# Patient Record
Sex: FEMALE | Employment: UNEMPLOYED | URBAN - METROPOLITAN AREA
[De-identification: names, ages, dates, MRNs, and addresses within clinical notes are randomized per-mention and may not be internally consistent; named-entity substitution may affect disease eponyms.]

---

## 2017-01-04 ENCOUNTER — TELEPHONE (OUTPATIENT)
Dept: FAMILY MEDICINE CLINIC | Facility: CLINIC | Age: 57
End: 2017-01-04

## 2017-02-04 ENCOUNTER — HOSPITAL ENCOUNTER (OUTPATIENT)
Dept: MAMMOGRAPHY | Facility: HOSPITAL | Age: 57
Discharge: HOME | End: 2017-02-04
Payer: COMMERCIAL

## 2017-02-04 DIAGNOSIS — Z12.31 SCREENING MAMMOGRAM, ENCOUNTER FOR: ICD-10-CM

## 2017-02-04 PROCEDURE — 77067 SCR MAMMO BI INCL CAD: CPT

## 2017-02-21 ENCOUNTER — OFFICE VISIT (OUTPATIENT)
Dept: FAMILY MEDICINE CLINIC | Facility: CLINIC | Age: 57
End: 2017-02-21

## 2017-02-21 VITALS
DIASTOLIC BLOOD PRESSURE: 70 MMHG | BODY MASS INDEX: 38 KG/M2 | WEIGHT: 206 LBS | OXYGEN SATURATION: 97 % | HEART RATE: 78 BPM | SYSTOLIC BLOOD PRESSURE: 112 MMHG

## 2017-02-21 DIAGNOSIS — M70.61 GREATER TROCHANTERIC BURSITIS OF RIGHT HIP: Primary | ICD-10-CM

## 2017-02-21 PROCEDURE — 99213 OFFICE O/P EST LOW 20 MIN: CPT

## 2017-02-21 RX ORDER — NAPROXEN 500 MG/1
500 TABLET ORAL 2 TIMES DAILY WITH MEALS
Qty: 60 TABLET | Refills: 0 | Status: SHIPPED | OUTPATIENT
Start: 2017-02-21 | End: 2017-03-23

## 2017-02-21 RX ORDER — METHYLPREDNISOLONE 4 MG/1
TABLET ORAL
Qty: 1 KIT | Refills: 0 | Status: SHIPPED | OUTPATIENT
Start: 2017-02-21 | End: 2017-07-25 | Stop reason: ALTCHOICE

## 2017-02-21 NOTE — PROGRESS NOTES
HPI:    Patient ID: Vic Fay is a 62year old female. Hip Pain   The pain is present in the right hip. This is a new problem. Episode onset: 2 weeks ago. The problem occurs constantly. The problem has been gradually improving.  The quality of the is oriented to person, place, and time. She appears well-developed and well-nourished. No distress. Musculoskeletal:        Right hip: She exhibits tenderness and bony tenderness. She exhibits normal range of motion, normal strength and no crepitus.    Ne

## 2017-02-22 NOTE — PATIENT INSTRUCTIONS
Bursitis [Bursitis]    Las articulaciones [employer] del cuerpo están rodeadas por sudha bolsa pequeña y Christia Kroner de líquido, para facilitar el movimiento de los músculos y los tendones sobre la articulación.   La bursitis es sudha inflamación de la bolsa · Luis M Maurer de 100.4°F (38°C) o más luisana, o jimmy le Conifer Kimberly indicado parker proveedor de atención médica  Date Last Reviewed: 11/21/2015  © 5127-4374 [address], [address] [place] [address]. Todos los derechos reservados.  Esta informació

## 2017-03-23 ENCOUNTER — NURSE ONLY (OUTPATIENT)
Dept: FAMILY MEDICINE CLINIC | Facility: CLINIC | Age: 57
End: 2017-03-23

## 2017-03-23 DIAGNOSIS — E03.9 HYPOTHYROIDISM, UNSPECIFIED TYPE: ICD-10-CM

## 2017-03-23 DIAGNOSIS — E78.00 HYPERCHOLESTEREMIA: ICD-10-CM

## 2017-03-23 LAB
ALBUMIN SERPL BCP-MCNC: 4.1 G/DL (ref 3.5–4.8)
ALBUMIN/GLOB SERPL: 1.8 {RATIO} (ref 1–2)
ALP SERPL-CCNC: 60 U/L (ref 32–100)
ALT SERPL-CCNC: 45 U/L (ref 14–54)
ANION GAP SERPL CALC-SCNC: 7 MMOL/L (ref 0–18)
AST SERPL-CCNC: 47 U/L (ref 15–41)
BILIRUB SERPL-MCNC: 0.4 MG/DL (ref 0.3–1.2)
BUN SERPL-MCNC: 15 MG/DL (ref 8–20)
BUN/CREAT SERPL: 18.3 (ref 10–20)
CALCIUM SERPL-MCNC: 9.4 MG/DL (ref 8.5–10.5)
CHLORIDE SERPL-SCNC: 107 MMOL/L (ref 95–110)
CHOLEST SERPL-MCNC: 185 MG/DL (ref 110–200)
CO2 SERPL-SCNC: 27 MMOL/L (ref 22–32)
CREAT SERPL-MCNC: 0.82 MG/DL (ref 0.5–1.5)
GLOBULIN PLAS-MCNC: 2.3 G/DL (ref 2.5–3.7)
GLUCOSE SERPL-MCNC: 87 MG/DL (ref 70–99)
HDLC SERPL-MCNC: 81 MG/DL
LDLC SERPL CALC-MCNC: 89 MG/DL (ref 0–99)
NONHDLC SERPL-MCNC: 104 MG/DL
OSMOLALITY UR CALC.SUM OF ELEC: 292 MOSM/KG (ref 275–295)
POTASSIUM SERPL-SCNC: 4.6 MMOL/L (ref 3.3–5.1)
PROT SERPL-MCNC: 6.4 G/DL (ref 5.9–8.4)
SODIUM SERPL-SCNC: 141 MMOL/L (ref 136–144)
TRIGL SERPL-MCNC: 74 MG/DL (ref 1–149)
TSH SERPL-ACNC: 0.9 UIU/ML (ref 0.34–5.6)

## 2017-03-23 PROCEDURE — 36415 COLL VENOUS BLD VENIPUNCTURE: CPT

## 2017-03-23 PROCEDURE — 80053 COMPREHEN METABOLIC PANEL: CPT

## 2017-03-23 PROCEDURE — 84443 ASSAY THYROID STIM HORMONE: CPT

## 2017-03-23 PROCEDURE — 80061 LIPID PANEL: CPT

## 2017-03-24 ENCOUNTER — TELEPHONE (OUTPATIENT)
Dept: FAMILY MEDICINE CLINIC | Facility: CLINIC | Age: 57
End: 2017-03-24

## 2017-03-24 DIAGNOSIS — E03.9 HYPOTHYROIDISM, UNSPECIFIED TYPE: ICD-10-CM

## 2017-03-24 DIAGNOSIS — E78.00 HYPERCHOLESTEREMIA: Primary | ICD-10-CM

## 2017-03-24 RX ORDER — LEVOTHYROXINE SODIUM 0.07 MG/1
75 TABLET ORAL
Qty: 90 TABLET | Refills: 3 | Status: SHIPPED | OUTPATIENT
Start: 2017-03-24 | End: 2017-07-25

## 2017-03-24 RX ORDER — FENOFIBRATE 120 MG/1
1 TABLET ORAL DAILY
Qty: 90 TABLET | Refills: 3 | Status: SHIPPED | OUTPATIENT
Start: 2017-03-24 | End: 2017-07-25

## 2017-07-18 ENCOUNTER — NURSE ONLY (OUTPATIENT)
Dept: FAMILY MEDICINE CLINIC | Facility: CLINIC | Age: 57
End: 2017-07-18

## 2017-07-18 DIAGNOSIS — Z00.00 ROUTINE GENERAL MEDICAL EXAMINATION AT A HEALTH CARE FACILITY: Primary | ICD-10-CM

## 2017-07-18 DIAGNOSIS — E03.9 ACQUIRED HYPOTHYROIDISM: ICD-10-CM

## 2017-07-18 LAB
ALBUMIN SERPL BCP-MCNC: 4.3 G/DL (ref 3.5–4.8)
ALBUMIN/GLOB SERPL: 1.7 {RATIO} (ref 1–2)
ALP SERPL-CCNC: 59 U/L (ref 32–100)
ALT SERPL-CCNC: 28 U/L (ref 14–54)
ANION GAP SERPL CALC-SCNC: 10 MMOL/L (ref 0–18)
AST SERPL-CCNC: 32 U/L (ref 15–41)
BASOPHILS # BLD: 0 K/UL (ref 0–0.2)
BASOPHILS NFR BLD: 1 %
BILIRUB SERPL-MCNC: 0.3 MG/DL (ref 0.3–1.2)
BILIRUB UR QL: NEGATIVE
BUN SERPL-MCNC: 24 MG/DL (ref 8–20)
BUN/CREAT SERPL: 28.9 (ref 10–20)
CALCIUM SERPL-MCNC: 9.2 MG/DL (ref 8.5–10.5)
CHLORIDE SERPL-SCNC: 105 MMOL/L (ref 95–110)
CHOLEST SERPL-MCNC: 176 MG/DL (ref 110–200)
CLARITY UR: CLEAR
CO2 SERPL-SCNC: 25 MMOL/L (ref 22–32)
COLOR UR: YELLOW
CREAT SERPL-MCNC: 0.83 MG/DL (ref 0.5–1.5)
EOSINOPHIL # BLD: 0.1 K/UL (ref 0–0.7)
EOSINOPHIL NFR BLD: 1 %
ERYTHROCYTE [DISTWIDTH] IN BLOOD BY AUTOMATED COUNT: 12.1 % (ref 11–15)
GLOBULIN PLAS-MCNC: 2.5 G/DL (ref 2.5–3.7)
GLUCOSE SERPL-MCNC: 82 MG/DL (ref 70–99)
GLUCOSE UR-MCNC: NEGATIVE MG/DL
HCT VFR BLD AUTO: 36.8 % (ref 35–48)
HDLC SERPL-MCNC: 81 MG/DL
HGB BLD-MCNC: 12.3 G/DL (ref 12–16)
HGB UR QL STRIP.AUTO: NEGATIVE
KETONES UR-MCNC: NEGATIVE MG/DL
LDLC SERPL CALC-MCNC: 73 MG/DL (ref 0–99)
LEUKOCYTE ESTERASE UR QL STRIP.AUTO: NEGATIVE
LYMPHOCYTES # BLD: 1.6 K/UL (ref 1–4)
LYMPHOCYTES NFR BLD: 34 %
MCH RBC QN AUTO: 30.6 PG (ref 27–32)
MCHC RBC AUTO-ENTMCNC: 33.4 G/DL (ref 32–37)
MCV RBC AUTO: 91.7 FL (ref 80–100)
MONOCYTES # BLD: 0.3 K/UL (ref 0–1)
MONOCYTES NFR BLD: 6 %
NEUTROPHILS # BLD AUTO: 2.8 K/UL (ref 1.8–7.7)
NEUTROPHILS NFR BLD: 58 %
NITRITE UR QL STRIP.AUTO: NEGATIVE
NONHDLC SERPL-MCNC: 95 MG/DL
OSMOLALITY UR CALC.SUM OF ELEC: 293 MOSM/KG (ref 275–295)
PH UR: 5 [PH] (ref 5–8)
PLATELET # BLD AUTO: 267 K/UL (ref 140–400)
PMV BLD AUTO: 7.5 FL (ref 7.4–10.3)
POTASSIUM SERPL-SCNC: 4.6 MMOL/L (ref 3.3–5.1)
PROT SERPL-MCNC: 6.8 G/DL (ref 5.9–8.4)
PROT UR-MCNC: NEGATIVE MG/DL
RBC # BLD AUTO: 4.02 M/UL (ref 3.7–5.4)
SODIUM SERPL-SCNC: 140 MMOL/L (ref 136–144)
SP GR UR STRIP: 1.02 (ref 1–1.03)
TRIGL SERPL-MCNC: 112 MG/DL (ref 1–149)
TSH SERPL-ACNC: 0.78 UIU/ML (ref 0.45–5.33)
UROBILINOGEN UR STRIP-ACNC: <2
VIT C UR-MCNC: NEGATIVE MG/DL
WBC # BLD AUTO: 4.9 K/UL (ref 4–11)

## 2017-07-18 PROCEDURE — 85025 COMPLETE CBC W/AUTO DIFF WBC: CPT

## 2017-07-18 PROCEDURE — 81003 URINALYSIS AUTO W/O SCOPE: CPT

## 2017-07-18 PROCEDURE — 36415 COLL VENOUS BLD VENIPUNCTURE: CPT

## 2017-07-18 PROCEDURE — 84443 ASSAY THYROID STIM HORMONE: CPT

## 2017-07-18 PROCEDURE — 80053 COMPREHEN METABOLIC PANEL: CPT

## 2017-07-18 PROCEDURE — 80061 LIPID PANEL: CPT

## 2017-07-18 NOTE — PROGRESS NOTES
Patient presented to clinic for routine and thyroid blood work. Orders verified in patient chart. Name and  verified. Patient is fasting. Blood drawn from the right antecubital, tolerated well without complications.

## 2017-07-25 ENCOUNTER — OFFICE VISIT (OUTPATIENT)
Dept: FAMILY MEDICINE CLINIC | Facility: CLINIC | Age: 57
End: 2017-07-25

## 2017-07-25 VITALS
DIASTOLIC BLOOD PRESSURE: 70 MMHG | SYSTOLIC BLOOD PRESSURE: 110 MMHG | BODY MASS INDEX: 38.02 KG/M2 | HEART RATE: 69 BPM | OXYGEN SATURATION: 97 % | WEIGHT: 204 LBS | HEIGHT: 61.5 IN

## 2017-07-25 DIAGNOSIS — K64.8 INTERNAL HEMORRHOIDS: ICD-10-CM

## 2017-07-25 DIAGNOSIS — Z13.31 DEPRESSION SCREENING: ICD-10-CM

## 2017-07-25 DIAGNOSIS — Z00.01 ENCOUNTER FOR ROUTINE ADULT HEALTH EXAMINATION WITH ABNORMAL FINDINGS: Primary | ICD-10-CM

## 2017-07-25 DIAGNOSIS — E66.09 NON MORBID OBESITY DUE TO EXCESS CALORIES: ICD-10-CM

## 2017-07-25 DIAGNOSIS — E03.9 HYPOTHYROIDISM, UNSPECIFIED TYPE: ICD-10-CM

## 2017-07-25 DIAGNOSIS — E78.00 HYPERCHOLESTEREMIA: ICD-10-CM

## 2017-07-25 PROBLEM — M70.61 GREATER TROCHANTERIC BURSITIS OF RIGHT HIP: Status: RESOLVED | Noted: 2017-02-21 | Resolved: 2017-07-25

## 2017-07-25 PROCEDURE — 99396 PREV VISIT EST AGE 40-64: CPT

## 2017-07-25 RX ORDER — LEVOTHYROXINE SODIUM 0.07 MG/1
75 TABLET ORAL
Qty: 90 TABLET | Refills: 1 | Status: SHIPPED | OUTPATIENT
Start: 2017-07-25 | End: 2018-01-11

## 2017-07-25 RX ORDER — FENOFIBRATE 120 MG/1
1 TABLET ORAL DAILY
Qty: 90 TABLET | Refills: 3 | Status: SHIPPED | OUTPATIENT
Start: 2017-07-25

## 2017-07-26 NOTE — PATIENT INSTRUCTIONS
Pautas de prevención para mujeres de entre 48 y 59 años de edad  [address] detección y las vacunas son importantes para el manejo de parker karen.  La consejería sobre parker karen también es esencial. A continuación, verá pautas en relación con esos temas p Clamidia Las mujeres con mayor riesgo de infección En los exámenes de rutina   Cáncer colorrectal Todas las mujeres de indira denzel de edad Sudha sigmoidoscopia flexible cada negro años o sudha colonoscopia cada [place], o un enema con doble contraste de [place] [address] de indira denzel de edad que no tienen registro de kal tenido esta infección o haberse aplicado esta vacuna Dos dosis.  La segunda dosis debería aplicársela al menos cuatro semanas después de la primera dosis   Hepatitis A Las muje Contra el herpes zóster [employer]  de 61 años o más [place] dosis   Consejería Ciarra Cogan   Pruebas sobre mutación de los genes BRCA para perfecto el riesgo de tener cáncer de ovario y cáncer de seno Las mujeres con mayor riesgo de tener mutación de Diabetes tipo 2 o prediabetes [employer] a los [facility], y los adultos que no tengan síntomas a cualquier edad, que tengan sobrepeso o que david obesos y que tengan 1 o más riesgos de diabetes Al menos cada angelia años   Uso indebido del alcoh Cáncer colorrectal Todas las mujeres de indira denzel de edad [place] sigmoidoscopia flexible cada negro años o sudha colonoscopia cada [place], o un enema con doble contraste de bario cada negro años, un análisis anual de sobia oculta en las heces o un test inm

## 2017-07-26 NOTE — PROGRESS NOTES
CC: Annual Physical Exam    HPI:   Lenny Leyden is a 62year old female who presents for a complete physical exam. Symptoms: denies discharge, itching, burning or dysuria, is menopausal. Patient denies any complaints at this time.     Wt Readings from ANDREIA Negative Negative mg/dL   Microscopic Microscopic not indicated    -CBC W/ DIFFERENTIAL   Result Value Ref Range   WBC 4.9 4.0 - 11.0 K/UL   RBC 4.02 3.70 - 5.40 M/UL   HGB 12.3 12.0 - 16.0 g/dL   HCT 36.8 35.0 - 48.0 %   MCV 91.7 80.0 - 100.0 fL   MCH 30. daughter and 2 sons. Exercise: minimal.  Diet: watches minimally     REVIEW OF SYSTEMS:   CONSTITUTIONAL:  Denies unusual weight gain/loss, fever, chills, or fatigue.   EENT:  Eyes:  Denies eye pain, visual loss, blurred vision, double vision or yellow sc erythema. Nose: patent, no nasal discharge Throat:  No tonsillar erythema or exudate. Mouth:  No oral lesions or ulcerations, good dentition. NECK: Supple, no CLAD, no JVD, no carotid bruit, no thyromegaly.   SKIN: No rashes, no skin lesion, no bruising, depressed, or hopeless (over the last two weeks)?: Not at all    PHQ-2 SCORE: 0        6. Non morbid obesity due to excess calories  7.  BMI 37.0-37.9, adult  - Pt's Body mass index is 37.92 kg/m², recommended low fat diet and aerobic exercise 30 minutes th

## 2018-01-10 ENCOUNTER — OFFICE VISIT (OUTPATIENT)
Dept: FAMILY MEDICINE CLINIC | Facility: CLINIC | Age: 58
End: 2018-01-10

## 2018-01-10 VITALS
HEART RATE: 78 BPM | WEIGHT: 200 LBS | HEIGHT: 61.5 IN | DIASTOLIC BLOOD PRESSURE: 70 MMHG | BODY MASS INDEX: 37.28 KG/M2 | SYSTOLIC BLOOD PRESSURE: 118 MMHG | RESPIRATION RATE: 18 BRPM | OXYGEN SATURATION: 98 %

## 2018-01-10 DIAGNOSIS — Z12.31 ENCOUNTER FOR SCREENING MAMMOGRAM FOR BREAST CANCER: ICD-10-CM

## 2018-01-10 DIAGNOSIS — E78.00 HYPERCHOLESTEREMIA: ICD-10-CM

## 2018-01-10 DIAGNOSIS — Z01.89 ENCOUNTER FOR ROUTINE LABORATORY TESTING: ICD-10-CM

## 2018-01-10 DIAGNOSIS — E03.9 HYPOTHYROIDISM, UNSPECIFIED TYPE: Primary | ICD-10-CM

## 2018-01-10 DIAGNOSIS — Z23 NEED FOR INFLUENZA VACCINATION: ICD-10-CM

## 2018-01-10 LAB — TSH SERPL-ACNC: 1.49 UIU/ML (ref 0.45–5.33)

## 2018-01-10 PROCEDURE — 99213 OFFICE O/P EST LOW 20 MIN: CPT

## 2018-01-10 PROCEDURE — 90686 IIV4 VACC NO PRSV 0.5 ML IM: CPT

## 2018-01-10 PROCEDURE — 90471 IMMUNIZATION ADMIN: CPT

## 2018-01-10 PROCEDURE — 84443 ASSAY THYROID STIM HORMONE: CPT

## 2018-01-11 DIAGNOSIS — E03.9 HYPOTHYROIDISM, UNSPECIFIED TYPE: ICD-10-CM

## 2018-01-11 PROBLEM — Z23 NEED FOR INFLUENZA VACCINATION: Status: ACTIVE | Noted: 2018-01-11

## 2018-01-11 PROBLEM — Z12.31 ENCOUNTER FOR SCREENING MAMMOGRAM FOR BREAST CANCER: Status: ACTIVE | Noted: 2018-01-11

## 2018-01-11 RX ORDER — LEVOTHYROXINE SODIUM 0.07 MG/1
75 TABLET ORAL
Qty: 90 TABLET | Refills: 1 | Status: SHIPPED | OUTPATIENT
Start: 2018-01-11

## 2018-01-11 NOTE — PATIENT INSTRUCTIONS
Hipotiroidismo    A usted le colón diagnosticado hipotiroidismo. [place] significa que parker glándula tiroides no está produciendo suficiente hormona tiroidea. Esta hormona es importante para el crecimiento del cuerpo y el metabolismo.  Si no tiene suficiente, mu · [place] maris pastillas de hormona tiroidea exactamente jimmy le indicó parker proveedor de [facility]. En la [employer] de los casos es sudha pastilla por día con el estómago vacío.  Use un pastillero con los días de la semana para ayudarse a recordar jose parker pas © 9687-7654 [address]. [address] [facility], [address] [facility]. Todos los derechos reservados. Esta información no pretende sustituir la atención médica profesional. Sólo parker médico puede diagnosticar y tratar un problema de karen.

## 2018-01-11 NOTE — PROGRESS NOTES
HPI:    Patient ID: Hair Klein is a 62year old female. Thyroid Problem   This is a chronic problem. The current episode started more than 1 year ago. The problem occurs daily. Progression since onset: stable.  Pertinent negatives include no abdomi distress. Cardiovascular: Normal rate, regular rhythm and normal heart sounds. Pulmonary/Chest: Effort normal and breath sounds normal. No respiratory distress. Neurological: She is alert and oriented to person, place, and time.    Skin: Skin is warm

## 2018-01-12 ENCOUNTER — TELEPHONE (OUTPATIENT)
Dept: FAMILY MEDICINE CLINIC | Facility: CLINIC | Age: 58
End: 2018-01-12

## 2018-01-12 NOTE — TELEPHONE ENCOUNTER
Patient notified about results and that she has to be back in 6 months to have routine labs done before her yearly exam.

## 2018-02-07 ENCOUNTER — HOSPITAL ENCOUNTER (OUTPATIENT)
Dept: MAMMOGRAPHY | Age: 58
Discharge: HOME | End: 2018-02-07
Payer: COMMERCIAL

## 2018-02-07 DIAGNOSIS — Z12.31 ENCOUNTER FOR SCREENING MAMMOGRAM FOR BREAST CANCER: ICD-10-CM

## 2018-02-07 PROCEDURE — 77067 SCR MAMMO BI INCL CAD: CPT

## 2018-02-22 ENCOUNTER — TELEPHONE (OUTPATIENT)
Dept: FAMILY MEDICINE CLINIC | Facility: CLINIC | Age: 58
End: 2018-02-22

## 2018-03-07 ENCOUNTER — APPOINTMENT (OUTPATIENT)
Dept: GENERAL RADIOLOGY | Facility: HOSPITAL | Age: 58
End: 2018-03-07
Attending: EMERGENCY MEDICINE
Payer: COMMERCIAL

## 2018-03-07 ENCOUNTER — HOSPITAL ENCOUNTER (EMERGENCY)
Facility: HOSPITAL | Age: 58
Discharge: HOME | End: 2018-03-07
Attending: EMERGENCY MEDICINE
Payer: COMMERCIAL

## 2018-03-07 VITALS
OXYGEN SATURATION: 97 % | HEIGHT: 62 IN | TEMPERATURE: 98 F | SYSTOLIC BLOOD PRESSURE: 123 MMHG | WEIGHT: 200 LBS | BODY MASS INDEX: 36.8 KG/M2 | RESPIRATION RATE: 18 BRPM | HEART RATE: 66 BPM | DIASTOLIC BLOOD PRESSURE: 73 MMHG

## 2018-03-07 DIAGNOSIS — S93.402A MILD SPRAIN OF LEFT ANKLE, INITIAL ENCOUNTER: Primary | ICD-10-CM

## 2018-03-07 PROCEDURE — 73610 X-RAY EXAM OF ANKLE: CPT | Performed by: EMERGENCY MEDICINE

## 2018-03-07 PROCEDURE — 73650 X-RAY EXAM OF HEEL: CPT | Performed by: EMERGENCY MEDICINE

## 2018-03-07 PROCEDURE — 99283 EMERGENCY DEPT VISIT LOW MDM: CPT

## 2018-03-07 RX ORDER — IBUPROFEN 600 MG/1
600 TABLET ORAL ONCE
Status: COMPLETED | OUTPATIENT
Start: 2018-03-07 | End: 2018-03-07

## 2018-03-07 NOTE — ED NOTES
Pt presents to ED with a c/o left ankle and heel pain after a mechanical fall last night. Denies any head injury.

## 2018-03-07 NOTE — ED PROVIDER NOTES
Patient Seen in: [facility] AND [facility] Emergency Department    History   Patient presents with: Foot Pain    Stated Complaint: left foot pain    HPI    Patient is a 43-year-old female who presents with left ankle injury that occurred yesterday.   Patient sta normal motor and sensation  SKIN: no abrasions or lacerations  M/S: TTP over left medial ankle. No deformity with FROM.  Foot/knee nontender    ED Course   Labs Reviewed - No data to display    ED Course as of Mar 07 0819  ----------------------------------

## 2018-03-09 ENCOUNTER — OFFICE VISIT (OUTPATIENT)
Dept: FAMILY MEDICINE CLINIC | Facility: CLINIC | Age: 58
End: 2018-03-09

## 2018-03-09 VITALS — SYSTOLIC BLOOD PRESSURE: 106 MMHG | DIASTOLIC BLOOD PRESSURE: 80 MMHG | OXYGEN SATURATION: 98 % | HEART RATE: 77 BPM

## 2018-03-09 DIAGNOSIS — S93.402A MILD SPRAIN OF LEFT ANKLE, INITIAL ENCOUNTER: Primary | ICD-10-CM

## 2018-03-09 DIAGNOSIS — M77.32 CALCANEAL SPUR OF LEFT FOOT: ICD-10-CM

## 2018-03-09 PROBLEM — Z23 NEED FOR INFLUENZA VACCINATION: Status: RESOLVED | Noted: 2018-01-11 | Resolved: 2018-03-09

## 2018-03-09 PROBLEM — Z12.31 ENCOUNTER FOR SCREENING MAMMOGRAM FOR BREAST CANCER: Status: RESOLVED | Noted: 2018-01-11 | Resolved: 2018-03-09

## 2018-03-09 PROBLEM — Z13.31 DEPRESSION SCREENING: Status: RESOLVED | Noted: 2017-07-25 | Resolved: 2018-03-09

## 2018-03-09 PROCEDURE — 99213 OFFICE O/P EST LOW 20 MIN: CPT

## 2018-03-09 RX ORDER — NAPROXEN 500 MG/1
500 TABLET ORAL 2 TIMES DAILY WITH MEALS
Qty: 60 TABLET | Refills: 0 | Status: SHIPPED | OUTPATIENT
Start: 2018-03-09

## 2018-03-09 NOTE — PATIENT INSTRUCTIONS
Tratamiento de los esguinces de tobillo  El tratamiento dependerá de la seriedad de parker esguince. Si se trata de un esguince grave, usted puede tardar 3 meses o más en recuperarse. Inmediatamente después de la lesión:  Repose:  Al principio, procure no · Rogers dedos del pie hacia meka, meñoo don de usted. Repita indira ejercicio rain 2–3 minutos. Date Last Reviewed: 9/28/2015  © 1883-2650 [address]. [address] [facility], [address] [facility]. Todos los derechos reservados.  Est

## 2018-03-09 NOTE — PROGRESS NOTES
HPI:    Patient ID: Susan Ortiz is a 62year old female. Heel Pain   This is a new problem. Episode onset: 3 days ago. The problem occurs daily. The problem has been gradually improving. Associated symptoms include arthralgias and joint swelling.  P 1 tablet by mouth daily. Disp: 90 tablet Rfl: 3     Allergies:No Known Allergies   PHYSICAL EXAM:   Physical Exam   Constitutional: She is oriented to person, place, and time. She appears well-developed and well-nourished. No distress.    Musculoskeletal:

## 2018-04-23 ENCOUNTER — TELEPHONE (OUTPATIENT)
Dept: FAMILY MEDICINE CLINIC | Facility: CLINIC | Age: 58
End: 2018-04-23

## 2018-04-23 NOTE — TELEPHONE ENCOUNTER
Patient called to request refill on medications due to not having insurance coverage and until they figure out the situation. Okay per Dr. Ellie carter to give her 3 extra refills. [address] [place] [address],[address] and authorized medications.  Patient is aware of ref

## 2022-04-06 ENCOUNTER — HOSPITAL ENCOUNTER (OUTPATIENT)
Age: 62
Discharge: HOME | End: 2022-04-06
Payer: COMMERCIAL

## 2022-04-06 VITALS
TEMPERATURE: 98 F | SYSTOLIC BLOOD PRESSURE: 141 MMHG | WEIGHT: 200 LBS | OXYGEN SATURATION: 98 % | DIASTOLIC BLOOD PRESSURE: 73 MMHG | RESPIRATION RATE: 18 BRPM | HEIGHT: 64 IN | HEART RATE: 92 BPM | BODY MASS INDEX: 34.15 KG/M2

## 2022-04-06 DIAGNOSIS — N30.01 ACUTE CYSTITIS WITH HEMATURIA: Primary | ICD-10-CM

## 2022-04-06 LAB
BILIRUB UR QL STRIP: NEGATIVE
CLARITY UR: CLEAR
GLUCOSE UR STRIP-MCNC: 100 MG/DL
KETONES UR STRIP-MCNC: NEGATIVE MG/DL
NITRITE UR QL STRIP: POSITIVE
PH UR STRIP: 5.5 [PH]
PROT UR STRIP-MCNC: 30 MG/DL
SP GR UR STRIP: 1.01
UROBILINOGEN UR STRIP-ACNC: 2 MG/DL

## 2022-04-06 PROCEDURE — 87086 URINE CULTURE/COLONY COUNT: CPT | Performed by: NURSE PRACTITIONER

## 2022-04-06 PROCEDURE — 87088 URINE BACTERIA CULTURE: CPT | Performed by: NURSE PRACTITIONER

## 2022-04-06 PROCEDURE — 87186 SC STD MICRODIL/AGAR DIL: CPT | Performed by: NURSE PRACTITIONER

## 2022-04-06 RX ORDER — CEPHALEXIN 500 MG/1
500 CAPSULE ORAL 2 TIMES DAILY
Qty: 14 CAPSULE | Refills: 0 | Status: SHIPPED | OUTPATIENT
Start: 2022-04-06 | End: 2022-04-13

## 2022-04-06 NOTE — ED INITIAL ASSESSMENT (HPI)
Pt here w c/o dysuria x 3 days. Pt states she started taking Azo yesterday w relief. Pt denies flank pain, no abd pain. No N/V. No fever.

## 2022-04-15 ENCOUNTER — LAB ENCOUNTER (OUTPATIENT)
Dept: LAB | Age: 62
End: 2022-04-15
Attending: FAMILY MEDICINE
Payer: COMMERCIAL

## 2022-04-15 ENCOUNTER — OFFICE VISIT (OUTPATIENT)
Dept: FAMILY MEDICINE CLINIC | Facility: CLINIC | Age: 62
End: 2022-04-15
Payer: COMMERCIAL

## 2022-04-15 VITALS
WEIGHT: 206 LBS | TEMPERATURE: 98 F | HEART RATE: 82 BPM | SYSTOLIC BLOOD PRESSURE: 105 MMHG | BODY MASS INDEX: 35.17 KG/M2 | HEIGHT: 64 IN | DIASTOLIC BLOOD PRESSURE: 67 MMHG

## 2022-04-15 DIAGNOSIS — Z23 NEED FOR SHINGLES VACCINE: ICD-10-CM

## 2022-04-15 DIAGNOSIS — E78.00 HYPERCHOLESTEREMIA: ICD-10-CM

## 2022-04-15 DIAGNOSIS — Z00.00 ENCOUNTER FOR ANNUAL HEALTH EXAMINATION: Primary | ICD-10-CM

## 2022-04-15 DIAGNOSIS — E03.9 HYPOTHYROIDISM, UNSPECIFIED TYPE: ICD-10-CM

## 2022-04-15 DIAGNOSIS — Z12.31 ENCOUNTER FOR SCREENING MAMMOGRAM FOR MALIGNANT NEOPLASM OF BREAST: ICD-10-CM

## 2022-04-15 DIAGNOSIS — Z00.00 ENCOUNTER FOR ANNUAL HEALTH EXAMINATION: ICD-10-CM

## 2022-04-15 LAB
ALBUMIN SERPL-MCNC: 3.9 G/DL (ref 3.4–5)
ALBUMIN/GLOB SERPL: 1.2 {RATIO} (ref 1–2)
ALP LIVER SERPL-CCNC: 96 U/L
ALT SERPL-CCNC: 31 U/L
ANION GAP SERPL CALC-SCNC: 6 MMOL/L (ref 0–18)
AST SERPL-CCNC: 25 U/L (ref 15–37)
BASOPHILS # BLD AUTO: 0.06 X10(3) UL (ref 0–0.2)
BASOPHILS NFR BLD AUTO: 0.8 %
BILIRUB SERPL-MCNC: 0.3 MG/DL (ref 0.1–2)
BUN BLD-MCNC: 13 MG/DL (ref 7–18)
BUN/CREAT SERPL: 15.1 (ref 10–20)
CALCIUM BLD-MCNC: 9.1 MG/DL (ref 8.5–10.1)
CHLORIDE SERPL-SCNC: 107 MMOL/L (ref 98–112)
CHOLEST SERPL-MCNC: 204 MG/DL (ref ?–200)
CO2 SERPL-SCNC: 29 MMOL/L (ref 21–32)
CREAT BLD-MCNC: 0.86 MG/DL
DEPRECATED RDW RBC AUTO: 39.4 FL (ref 35.1–46.3)
EOSINOPHIL # BLD AUTO: 0.1 X10(3) UL (ref 0–0.7)
EOSINOPHIL NFR BLD AUTO: 1.4 %
ERYTHROCYTE [DISTWIDTH] IN BLOOD BY AUTOMATED COUNT: 11.6 % (ref 11–15)
EST. AVERAGE GLUCOSE BLD GHB EST-MCNC: 111 MG/DL (ref 68–126)
FASTING PATIENT LIPID ANSWER: NO
FASTING STATUS PATIENT QL REPORTED: NO
GLOBULIN PLAS-MCNC: 3.2 G/DL (ref 2.8–4.4)
GLUCOSE BLD-MCNC: 108 MG/DL (ref 70–99)
HBA1C MFR BLD: 5.5 % (ref ?–5.7)
HCT VFR BLD AUTO: 37 %
HDLC SERPL-MCNC: 73 MG/DL (ref 40–59)
HGB BLD-MCNC: 12.1 G/DL
IMM GRANULOCYTES # BLD AUTO: 0.02 X10(3) UL (ref 0–1)
IMM GRANULOCYTES NFR BLD: 0.3 %
LDLC SERPL CALC-MCNC: 101 MG/DL (ref ?–100)
LYMPHOCYTES # BLD AUTO: 1.51 X10(3) UL (ref 1–4)
LYMPHOCYTES NFR BLD AUTO: 20.6 %
MCH RBC QN AUTO: 30.9 PG (ref 26–34)
MCHC RBC AUTO-ENTMCNC: 32.7 G/DL (ref 31–37)
MCV RBC AUTO: 94.4 FL
MONOCYTES # BLD AUTO: 0.56 X10(3) UL (ref 0.1–1)
MONOCYTES NFR BLD AUTO: 7.7 %
NEUTROPHILS # BLD AUTO: 5.07 X10 (3) UL (ref 1.5–7.7)
NEUTROPHILS # BLD AUTO: 5.07 X10(3) UL (ref 1.5–7.7)
NEUTROPHILS NFR BLD AUTO: 69.2 %
NONHDLC SERPL-MCNC: 131 MG/DL (ref ?–130)
OSMOLALITY SERPL CALC.SUM OF ELEC: 295 MOSM/KG (ref 275–295)
PLATELET # BLD AUTO: 261 10(3)UL (ref 150–450)
POTASSIUM SERPL-SCNC: 4.2 MMOL/L (ref 3.5–5.1)
PROT SERPL-MCNC: 7.1 G/DL (ref 6.4–8.2)
RBC # BLD AUTO: 3.92 X10(6)UL
SODIUM SERPL-SCNC: 142 MMOL/L (ref 136–145)
TRIGL SERPL-MCNC: 177 MG/DL (ref 30–149)
TSI SER-ACNC: 0.99 MIU/ML (ref 0.36–3.74)
VLDLC SERPL CALC-MCNC: 30 MG/DL (ref 0–30)
WBC # BLD AUTO: 7.3 X10(3) UL (ref 4–11)

## 2022-04-15 PROCEDURE — 80061 LIPID PANEL: CPT

## 2022-04-15 PROCEDURE — 85025 COMPLETE CBC W/AUTO DIFF WBC: CPT

## 2022-04-15 PROCEDURE — 3078F DIAST BP <80 MM HG: CPT | Performed by: FAMILY MEDICINE

## 2022-04-15 PROCEDURE — 3074F SYST BP LT 130 MM HG: CPT | Performed by: FAMILY MEDICINE

## 2022-04-15 PROCEDURE — 80053 COMPREHEN METABOLIC PANEL: CPT

## 2022-04-15 PROCEDURE — 99386 PREV VISIT NEW AGE 40-64: CPT | Performed by: FAMILY MEDICINE

## 2022-04-15 PROCEDURE — 83036 HEMOGLOBIN GLYCOSYLATED A1C: CPT

## 2022-04-15 PROCEDURE — 36415 COLL VENOUS BLD VENIPUNCTURE: CPT

## 2022-04-15 PROCEDURE — 3008F BODY MASS INDEX DOCD: CPT | Performed by: FAMILY MEDICINE

## 2022-04-15 PROCEDURE — 84443 ASSAY THYROID STIM HORMONE: CPT

## 2022-04-15 RX ORDER — ZOSTER VACCINE RECOMBINANT, ADJUVANTED 50 MCG/0.5
0.5 KIT INTRAMUSCULAR ONCE
Qty: 1 EACH | Refills: 0 | Status: SHIPPED | OUTPATIENT
Start: 2022-04-15 | End: 2022-04-15

## 2022-07-13 ENCOUNTER — OFFICE VISIT (OUTPATIENT)
Dept: FAMILY MEDICINE CLINIC | Facility: CLINIC | Age: 62
End: 2022-07-13
Payer: COMMERCIAL

## 2022-07-13 ENCOUNTER — HOSPITAL ENCOUNTER (OUTPATIENT)
Dept: GENERAL RADIOLOGY | Age: 62
Discharge: HOME | End: 2022-07-13
Attending: NURSE PRACTITIONER
Payer: COMMERCIAL

## 2022-07-13 VITALS
BODY MASS INDEX: 35.34 KG/M2 | WEIGHT: 207 LBS | DIASTOLIC BLOOD PRESSURE: 78 MMHG | HEIGHT: 64 IN | HEART RATE: 57 BPM | SYSTOLIC BLOOD PRESSURE: 123 MMHG

## 2022-07-13 DIAGNOSIS — S92.252A CLOSED DISPLACED FRACTURE OF NAVICULAR BONE OF LEFT FOOT, INITIAL ENCOUNTER: ICD-10-CM

## 2022-07-13 DIAGNOSIS — M79.89 SWELLING OF LEFT FOOT: ICD-10-CM

## 2022-07-13 DIAGNOSIS — M79.672 LEFT FOOT PAIN: Primary | ICD-10-CM

## 2022-07-13 DIAGNOSIS — M79.672 LEFT FOOT PAIN: ICD-10-CM

## 2022-07-13 PROCEDURE — 3078F DIAST BP <80 MM HG: CPT | Performed by: NURSE PRACTITIONER

## 2022-07-13 PROCEDURE — 73630 X-RAY EXAM OF FOOT: CPT | Performed by: NURSE PRACTITIONER

## 2022-07-13 PROCEDURE — 99214 OFFICE O/P EST MOD 30 MIN: CPT | Performed by: NURSE PRACTITIONER

## 2022-07-13 PROCEDURE — 3074F SYST BP LT 130 MM HG: CPT | Performed by: NURSE PRACTITIONER

## 2022-07-13 PROCEDURE — 3008F BODY MASS INDEX DOCD: CPT | Performed by: NURSE PRACTITIONER

## 2022-07-13 RX ORDER — IBUPROFEN 600 MG/1
600 TABLET ORAL EVERY 6 HOURS PRN
Qty: 40 TABLET | Refills: 0 | Status: SHIPPED | OUTPATIENT
Start: 2022-07-13 | End: 2022-08-12

## 2022-07-20 ENCOUNTER — OFFICE VISIT (OUTPATIENT)
Dept: PODIATRY CLINIC | Facility: CLINIC | Age: 62
End: 2022-07-20
Payer: COMMERCIAL

## 2022-07-20 ENCOUNTER — HOSPITAL ENCOUNTER (OUTPATIENT)
Dept: GENERAL RADIOLOGY | Age: 62
Discharge: HOME | End: 2022-07-20
Attending: PODIATRIST
Payer: COMMERCIAL

## 2022-07-20 DIAGNOSIS — M25.572 ACUTE LEFT ANKLE PAIN: ICD-10-CM

## 2022-07-20 DIAGNOSIS — S93.402A MILD ANKLE SPRAIN, LEFT, INITIAL ENCOUNTER: Primary | ICD-10-CM

## 2022-07-20 DIAGNOSIS — M25.472 EDEMA OF LEFT ANKLE: ICD-10-CM

## 2022-07-20 DIAGNOSIS — S93.402A MILD ANKLE SPRAIN, LEFT, INITIAL ENCOUNTER: ICD-10-CM

## 2022-07-20 DIAGNOSIS — M25.372 LEFT ANKLE INSTABILITY: ICD-10-CM

## 2022-07-20 PROCEDURE — 99203 OFFICE O/P NEW LOW 30 MIN: CPT | Performed by: PODIATRIST

## 2022-07-20 PROCEDURE — 73610 X-RAY EXAM OF ANKLE: CPT | Performed by: PODIATRIST

## 2022-11-29 ENCOUNTER — HOSPITAL ENCOUNTER (OUTPATIENT)
Dept: MAMMOGRAPHY | Facility: HOSPITAL | Age: 62
Discharge: HOME | End: 2022-11-29
Attending: FAMILY MEDICINE
Payer: COMMERCIAL

## 2022-11-29 DIAGNOSIS — Z00.00 ENCOUNTER FOR ANNUAL HEALTH EXAMINATION: ICD-10-CM

## 2022-11-29 PROCEDURE — 77063 BREAST TOMOSYNTHESIS BI: CPT | Performed by: FAMILY MEDICINE

## 2022-11-29 PROCEDURE — 77067 SCR MAMMO BI INCL CAD: CPT | Performed by: FAMILY MEDICINE

## 2025-03-05 ENCOUNTER — OFFICE VISIT (OUTPATIENT)
Age: 65
End: 2025-03-05
Payer: MEDICARE

## 2025-03-05 VITALS
OXYGEN SATURATION: 97 % | SYSTOLIC BLOOD PRESSURE: 110 MMHG | HEART RATE: 92 BPM | TEMPERATURE: 97 F | BODY MASS INDEX: 36 KG/M2 | HEIGHT: 64 IN | DIASTOLIC BLOOD PRESSURE: 60 MMHG

## 2025-03-05 DIAGNOSIS — Z00.00 PREVENTATIVE HEALTH CARE: ICD-10-CM

## 2025-03-05 DIAGNOSIS — Z91.89 AT RISK FOR OSTEOPOROSIS: ICD-10-CM

## 2025-03-05 DIAGNOSIS — Z76.89 ESTABLISHING CARE WITH NEW DOCTOR, ENCOUNTER FOR: Primary | ICD-10-CM

## 2025-03-05 DIAGNOSIS — E55.9 VITAMIN D DEFICIENCY: ICD-10-CM

## 2025-03-05 DIAGNOSIS — Z12.4 ROUTINE PAPANICOLAOU SMEAR: ICD-10-CM

## 2025-03-05 DIAGNOSIS — E78.5 HYPERLIPIDEMIA, UNSPECIFIED HYPERLIPIDEMIA TYPE: ICD-10-CM

## 2025-03-05 DIAGNOSIS — Z78.0 POSTMENOPAUSAL: ICD-10-CM

## 2025-03-05 DIAGNOSIS — Z12.31 ENCOUNTER FOR SCREENING MAMMOGRAM FOR MALIGNANT NEOPLASM OF BREAST: ICD-10-CM

## 2025-03-05 DIAGNOSIS — Z12.11 SCREEN FOR COLON CANCER: ICD-10-CM

## 2025-03-05 DIAGNOSIS — E03.9 HYPOTHYROIDISM, UNSPECIFIED TYPE: ICD-10-CM

## 2025-03-05 DIAGNOSIS — R73.03 PREDIABETES: ICD-10-CM

## 2025-03-05 DIAGNOSIS — Z11.59 NEED FOR HEPATITIS C SCREENING TEST: ICD-10-CM

## 2025-03-05 PROCEDURE — 90677 PCV20 VACCINE IM: CPT | Performed by: INTERNAL MEDICINE

## 2025-03-05 PROCEDURE — G0009 ADMIN PNEUMOCOCCAL VACCINE: HCPCS | Performed by: INTERNAL MEDICINE

## 2025-03-05 PROCEDURE — 3074F SYST BP LT 130 MM HG: CPT | Performed by: INTERNAL MEDICINE

## 2025-03-05 PROCEDURE — 99499 UNLISTED E&M SERVICE: CPT | Performed by: INTERNAL MEDICINE

## 2025-03-05 PROCEDURE — 3078F DIAST BP <80 MM HG: CPT | Performed by: INTERNAL MEDICINE

## 2025-03-05 PROCEDURE — 99205 OFFICE O/P NEW HI 60 MIN: CPT | Performed by: INTERNAL MEDICINE

## 2025-03-05 RX ORDER — TERBINAFINE HYDROCHLORIDE 250 MG/1
250 TABLET ORAL DAILY
COMMUNITY
Start: 2024-11-13

## 2025-03-06 ENCOUNTER — TELEPHONE (OUTPATIENT)
Facility: CLINIC | Age: 65
End: 2025-03-06

## 2025-03-06 NOTE — TELEPHONE ENCOUNTER
Spoke with patient and verified date of birth.     Reviewed indication to schedule telephone colon screening appointment.     Confirmed date, time and details for phone screening. Verified best contact number, 2-part GI questionnaire, and no active GI symptoms.     Verbalized understanding and appreciative for call.     Future Appointments   Date Time Provider Department Center   3/10/2025 10:00 AM GI COLON SCREENING [id number] None

## 2025-03-10 ENCOUNTER — LAB ENCOUNTER (OUTPATIENT)
Dept: LAB | Age: 65
End: 2025-03-10
Attending: INTERNAL MEDICINE
Payer: MEDICARE

## 2025-03-10 ENCOUNTER — NURSE ONLY (OUTPATIENT)
Facility: CLINIC | Age: 65
End: 2025-03-10

## 2025-03-10 DIAGNOSIS — Z12.11 COLON CANCER SCREENING: Primary | ICD-10-CM

## 2025-03-10 DIAGNOSIS — E55.9 VITAMIN D DEFICIENCY: ICD-10-CM

## 2025-03-10 LAB
ALBUMIN SERPL-MCNC: 4.3 G/DL (ref 3.2–4.8)
ALBUMIN/GLOB SERPL: 1.7 {RATIO} (ref 1–2)
ALP LIVER SERPL-CCNC: 95 U/L
ALT SERPL-CCNC: 18 U/L
ANION GAP SERPL CALC-SCNC: 9 MMOL/L (ref 0–18)
AST SERPL-CCNC: 23 U/L (ref ?–34)
BASOPHILS # BLD AUTO: 0.04 X10(3) UL (ref 0–0.2)
BASOPHILS NFR BLD AUTO: 0.8 %
BILIRUB SERPL-MCNC: 0.5 MG/DL (ref 0.2–1.1)
BUN BLD-MCNC: 15 MG/DL (ref 9–23)
BUN/CREAT SERPL: 19.5 (ref 10–20)
CALCIUM BLD-MCNC: 9 MG/DL (ref 8.7–10.4)
CHLORIDE SERPL-SCNC: 105 MMOL/L (ref 98–112)
CHOLEST SERPL-MCNC: 226 MG/DL (ref ?–200)
CO2 SERPL-SCNC: 25 MMOL/L (ref 21–32)
CREAT BLD-MCNC: 0.77 MG/DL
DEPRECATED RDW RBC AUTO: 39 FL (ref 35.1–46.3)
EGFRCR SERPLBLD CKD-EPI 2021: 86 ML/MIN/1.73M2 (ref 60–?)
EOSINOPHIL # BLD AUTO: 0.08 X10(3) UL (ref 0–0.7)
EOSINOPHIL NFR BLD AUTO: 1.7 %
ERYTHROCYTE [DISTWIDTH] IN BLOOD BY AUTOMATED COUNT: 11.6 % (ref 11–15)
EST. AVERAGE GLUCOSE BLD GHB EST-MCNC: 120 MG/DL (ref 68–126)
FASTING PATIENT LIPID ANSWER: YES
FASTING STATUS PATIENT QL REPORTED: YES
GLOBULIN PLAS-MCNC: 2.6 G/DL (ref 2–3.5)
GLUCOSE BLD-MCNC: 93 MG/DL (ref 70–99)
HBA1C MFR BLD: 5.8 % (ref ?–5.7)
HCT VFR BLD AUTO: 35.6 %
HCV AB SERPL QL IA: NONREACTIVE
HDLC SERPL-MCNC: 74 MG/DL (ref 40–59)
HGB BLD-MCNC: 11.7 G/DL
IMM GRANULOCYTES # BLD AUTO: 0.01 X10(3) UL (ref 0–1)
IMM GRANULOCYTES NFR BLD: 0.2 %
LDLC SERPL CALC-MCNC: 126 MG/DL (ref ?–100)
LYMPHOCYTES # BLD AUTO: 1.26 X10(3) UL (ref 1–4)
LYMPHOCYTES NFR BLD AUTO: 26.6 %
MCH RBC QN AUTO: 30.2 PG (ref 26–34)
MCHC RBC AUTO-ENTMCNC: 32.9 G/DL (ref 31–37)
MCV RBC AUTO: 91.8 FL
MONOCYTES # BLD AUTO: 0.39 X10(3) UL (ref 0.1–1)
MONOCYTES NFR BLD AUTO: 8.2 %
NEUTROPHILS # BLD AUTO: 2.95 X10 (3) UL (ref 1.5–7.7)
NEUTROPHILS # BLD AUTO: 2.95 X10(3) UL (ref 1.5–7.7)
NEUTROPHILS NFR BLD AUTO: 62.5 %
NONHDLC SERPL-MCNC: 152 MG/DL (ref ?–130)
OSMOLALITY SERPL CALC.SUM OF ELEC: 289 MOSM/KG (ref 275–295)
PLATELET # BLD AUTO: 255 10(3)UL (ref 150–450)
POTASSIUM SERPL-SCNC: 4.2 MMOL/L (ref 3.5–5.1)
PROT SERPL-MCNC: 6.9 G/DL (ref 5.7–8.2)
RBC # BLD AUTO: 3.88 X10(6)UL
SODIUM SERPL-SCNC: 139 MMOL/L (ref 136–145)
TRIGL SERPL-MCNC: 147 MG/DL (ref 30–149)
TSI SER-ACNC: 1.52 UIU/ML (ref 0.55–4.78)
VIT D+METAB SERPL-MCNC: 35.9 NG/ML (ref 30–100)
VLDLC SERPL CALC-MCNC: 26 MG/DL (ref 0–30)
WBC # BLD AUTO: 4.7 X10(3) UL (ref 4–11)

## 2025-03-10 PROCEDURE — 83036 HEMOGLOBIN GLYCOSYLATED A1C: CPT | Performed by: INTERNAL MEDICINE

## 2025-03-10 PROCEDURE — 82306 VITAMIN D 25 HYDROXY: CPT

## 2025-03-10 PROCEDURE — 80053 COMPREHEN METABOLIC PANEL: CPT | Performed by: INTERNAL MEDICINE

## 2025-03-10 PROCEDURE — 80061 LIPID PANEL: CPT | Performed by: INTERNAL MEDICINE

## 2025-03-10 PROCEDURE — 84443 ASSAY THYROID STIM HORMONE: CPT | Performed by: INTERNAL MEDICINE

## 2025-03-10 PROCEDURE — 86803 HEPATITIS C AB TEST: CPT | Performed by: INTERNAL MEDICINE

## 2025-03-10 PROCEDURE — 36415 COLL VENOUS BLD VENIPUNCTURE: CPT | Performed by: INTERNAL MEDICINE

## 2025-03-10 PROCEDURE — 85025 COMPLETE CBC W/AUTO DIFF WBC: CPT | Performed by: INTERNAL MEDICINE

## 2025-03-10 RX ORDER — POLYETHYLENE GLYCOL 3350, SODIUM CHLORIDE, SODIUM BICARBONATE, POTASSIUM CHLORIDE 420; 11.2; 5.72; 1.48 G/4L; G/4L; G/4L; G/4L
POWDER, FOR SOLUTION ORAL
Qty: 4000 ML | Refills: 0 | Status: SHIPPED | OUTPATIENT
Start: 2025-03-10

## 2025-03-10 NOTE — PROGRESS NOTES
Scheduling:  cln w/ [facility] w/ Dr. Phillips, Dr. Alegria  Dx: crc screening  trilyte split dose sent e-scribe

## 2025-03-10 NOTE — PROGRESS NOTES
Rosalie -    used ID#[id number]  Called patient for scheduled telephone colon screen.   Please advise on colonoscopy and bowel prep orders.   Medications, pharmacy, and allergies reviewed.     Age 45-74 y/o: Yes  › MD preference: None  › Insurance:  [employer] HMO  › Last PCP visit: Dr. Madden 3/5/25  › Last CBC: 2/27/24  › Date of positive FIT (if applicable): N/A  › H/W/BMI: 5'4 / 215 lb / 36.9 BMI    Special comments/notes: None  Telephone Colon Screening Questionnaire Yes No   Are you currently experiencing any GI symptoms [] [x]   If yes, explain:     Rectal bleeding [] [x]   Black stool [] [x]   Dysphagia or food \"feeling stuck\" when eating [] [x]   Intractable vomiting [] [x]   Unexplained weight loss [] [x]   First colonoscopy - last colon was about 5 years ago - in [place] - no polyps on last one  [] [x]   Family history of colon cancer [] [x]   Any issues with anesthesia [] [x]   If yes, explain:      Any recent complaints related to chest pain &/or shortness of breath [] [x]   Referred to a cardiologist?  [] [x]   If yes, explain:      History of respiratory issues/oxygen/PATRICK/COPD [] [x]   CPAP/BiPAP:     History of devices (pacemaker/defibrillator) [] [x]   History of heart attack &/or stroke [] [x]   If yes, in the last 12 months? Stent placement?  [] [x]     Medication Reconciliation  Yes  No   Anticoagulants (except Aspirin) [] [x]   Diabetic Medication [] [x]   Weight loss medication (phentermine/vyvanse/saxsenda/etc) [] [x]   Iron/herbal/multivitamin supplement(s) [] [x]   Usage of marijuana, CBD &/or vape product(s) [] [x]

## 2025-03-11 NOTE — PROGRESS NOTES
[facility] part of [facility]  New Patient History and Physical      HPI:     Chief Complaint   Patient presents with    New Patient       Coby Clemente is a 65 year old female presenting for:  Establish care.  Has  has a past medical history of Anemia, Healthy adult on routine physical examination, Hypothyroidism, Internal hemorrhoids (10/23/15), Obesity, Pain in soft tissues of limb, URI (upper respiratory infection), and Uterine myoma.    65-year-old female with a history of hypothyroidism and prediabetes, presents for [facility]. She reports waking up with head pain every day, which has persisted despite seeing a neurologist 5-6 years ago and taking medication for 5 years. The pain is described as mild and only present in the morning. The patient also complains of vertigo.    The patient reports experiencing chest pain. She has undergone annual mammograms, which have been normal. Additionally, she mentions nocturnal sweating, particularly from the neck up, requiring her to place a towel on her pillow. She uses a CPAP machine intermittently for sleep apnea but has not noticed a correlation between machine use and sweating.    The patient also reports symptoms of urinary incontinence. She inquires about treatment options, including medication or surgery.    Lastly, the patient mentions having cataracts in both eyes, as diagnosed by her ophthalmologist, Dr. Mendez.    Medical History  - Hypothyroidism  - Prediabetes  - Sleep apnea (implied by use of CPAP machine)  - Headaches (history of seeing neurologist and taking medication for 5 years)  - Vertigo  -  section (41 years ago)  - Incontinence (implied by discussion of potential surgery)  - Cataracts (both eyes)    Current and Past Medications and Supplements  - Levothyroxine (dosage not specified)    Social History  - Exercise habits: Patient tries to walk every day with spouse, but sometimes unable to  - Sleep: Uses CPAP  machine intermittently    Review of Systems  - General: Waking up with head pain every day  - Neurological: Vertigo  - Cardiovascular: Chest pain  - Skin: Sweating from neck up during sleep  - Genitourinary: Incontinence      Labs:   Complete Metabolic Panel:  Lab Results   Component Value Date/Time     03/10/2025 08:29 AM    K 4.2 03/10/2025 08:29 AM     03/10/2025 08:29 AM    CO2 25.0 03/10/2025 08:29 AM    CREATSERUM 0.77 03/10/2025 08:29 AM    CA 9.0 03/10/2025 08:29 AM    GLU 93 03/10/2025 08:29 AM    TP 6.9 03/10/2025 08:29 AM    ALB 4.3 03/10/2025 08:29 AM    ALKPHO 95 03/10/2025 08:29 AM    AST 23 03/10/2025 08:29 AM    ALT 18 03/10/2025 08:29 AM    BILT 0.5 03/10/2025 08:29 AM    TSH 1.520 03/10/2025 08:29 AM    T4F 1.18 06/30/2016 06:25 PM        CBC:  Lab Results   Component Value Date    WBC 4.7 03/10/2025    HGB 11.7 (L) 03/10/2025    HCT 35.6 03/10/2025    .0 03/10/2025    NEPERCENT 62.5 03/10/2025    LYPERCENT 26.6 03/10/2025    MOPERCENT 8.2 03/10/2025    EOPERCENT 1.7 03/10/2025    BAPERCENT 0.8 03/10/2025    NE 2.95 03/10/2025    LYMABS 1.26 03/10/2025    MOABSO 0.39 03/10/2025    EOABSO 0.08 03/10/2025    BAABSO 0.04 03/10/2025            Hemoglobin A1C, Microalbumin  Lab Results   Component Value Date/Time    A1C 5.8 (H) 03/10/2025 08:29 AM        Lipid panel  Lab Results   Component Value Date/Time    CHOLEST 226 (H) 03/10/2025 08:29 AM    HDL 74 (H) 03/10/2025 08:29 AM    TRIG 147 03/10/2025 08:29 AM     (H) 03/10/2025 08:29 AM    NONHDLC 152 (H) 03/10/2025 08:29 AM     The 10-year ASCVD risk score (Juancho LESTER, et al., 2019) is: 3.9%    Values used to calculate the score:      Age: 65 years      Sex: Female      Is Non- : No      Diabetic: No      Tobacco smoker: No      Systolic Blood Pressure: 110 mmHg      Is BP treated: No      HDL Cholesterol: 74 mg/dL      Total Cholesterol: 226 mg/dL       Medications:  Current Outpatient Medications    Medication Sig Dispense Refill    terbinafine 250 MG Oral Tab Take 1 tablet (250 mg total) by mouth daily.      Levothyroxine Sodium 75 MCG Oral Tab Take 1 tablet (75 mcg total) by mouth before breakfast. 90 tablet 1    PEG 3350-KCl-Na Bicarb-NaCl (TRILYTE) 420 g Oral Recon Soln Take prep as directed by gastro office. May substitute with Trilyte/generic equivalent if needed. 4000 mL 0      PMH:  Past Medical History:    Anemia    Healthy adult on routine physical examination    resolved    Hypothyroidism    Internal hemorrhoids    Obesity    Pain in soft tissues of limb    resolved    URI (upper respiratory infection)    resolved    Uterine myoma         PSH:  Past Surgical History:   Procedure Laterality Date    Breast biopsy Right 2009    benign final esults          Colonoscopy  10/23/15    internal hemorrhoids    [place] localization wire 1 site right (cpt=19281)      Other surgical history  3/2012    cauterization of fibroid       Allergies:  Allergies[1]   Social History:  Social History     Socioeconomic History    Marital status:    Tobacco Use    Smoking status: Never    Smokeless tobacco: Never   Vaping Use    Vaping status: Never Used   Substance and Sexual Activity    Alcohol use: No    Drug use: No    Sexual activity: Yes     Social Drivers of Health     Food Insecurity: No Food Insecurity (2024)    Received from [facility]    Food Insecurity     Within the past 30 days, I worried whether my food would run out before I got money to buy more. / En los últimos 30 días, me preocupó que la comida se podía acabar antes de tener dinero para compr...: Never true / Nunca     Within the past 30 days, the food that I bought just didn't last, and I didn't have money to get more. / En los últimos 30 días, La comida que compré no rindió lo suficiente, y no tenía dinero para...: Never true / Nunca        Family History:  Family History   Problem Relation Age of Onset     Diabetes Mother     Hypertension Mother     Arthritis Father     No Known Problems Daughter     No Known Problems Son     No Known Problems Son           REVIEW OF SYSTEMS:   Review of Systems   Constitutional:  Negative for chills, fatigue, fever and unexpected weight change.   HENT:  Negative for congestion, ear pain, hearing loss, rhinorrhea, sinus pain and sore throat.    Eyes:  Negative for pain, redness and visual disturbance.   Respiratory:  Negative for apnea, cough, chest tightness, shortness of breath and wheezing.    Cardiovascular:  Negative for chest pain, palpitations and leg swelling.   Gastrointestinal:  Negative for abdominal distention, abdominal pain, blood in stool, constipation and nausea.   Endocrine: Negative for cold intolerance, heat intolerance and polyuria.   Genitourinary:  Negative for dysuria, hematuria and urgency.   Musculoskeletal:  Negative for arthralgias, back pain, gait problem, joint swelling, myalgias and neck pain.   Skin:  Negative for rash and wound.   Allergic/Immunologic: Negative for food allergies and immunocompromised state.   Neurological:  Negative for dizziness, seizures, facial asymmetry, speech difficulty, weakness, light-headedness, numbness and headaches.   Hematological:  Negative for adenopathy. Does not bruise/bleed easily.   Psychiatric/Behavioral:  Negative for behavioral problems, sleep disturbance and suicidal ideas. The patient is not nervous/anxious.             PHYSICAL EXAM:   /60   Pulse 92   Temp 97.3 °F (36.3 °C)   Ht 5' 4\" (1.626 m)   SpO2 97%   BMI 35.53 kg/m²  Estimated body mass index is 35.53 kg/m² as calculated from the following:    Height as of this encounter: 5' 4\" (1.626 m).    Weight as of 7/13/22: 207 lb (93.9 kg).     Wt Readings from Last 3 Encounters:   07/13/22 207 lb (93.9 kg)   04/15/22 206 lb (93.4 kg)   04/06/22 200 lb (90.7 kg)       Physical Exam  Vitals reviewed.   Constitutional:       General: She is not in acute  distress.     Appearance: She is well-developed.   HENT:      Head: Normocephalic and atraumatic.   Eyes:      Conjunctiva/sclera: Conjunctivae normal.      Pupils: Pupils are equal, round, and reactive to light.   Neck:      Thyroid: No thyromegaly.   Cardiovascular:      Rate and Rhythm: Normal rate and regular rhythm.      Heart sounds: Normal heart sounds, S1 normal and S2 normal. No murmur heard.     No friction rub. No gallop.   Pulmonary:      Effort: Pulmonary effort is normal. No respiratory distress.      Breath sounds: Normal breath sounds. No wheezing or rales.   Chest:      Chest wall: No tenderness.   Abdominal:      General: Bowel sounds are normal. There is no distension.      Palpations: Abdomen is soft. There is no mass.      Tenderness: There is no abdominal tenderness. There is no guarding or rebound.   Musculoskeletal:         General: No tenderness. Normal range of motion.      Cervical back: Normal range of motion.   Lymphadenopathy:      Cervical: No cervical adenopathy.   Skin:     General: Skin is warm.      Findings: No erythema or rash.   Neurological:      Mental Status: She is alert and oriented to person, place, and time.      Cranial Nerves: No cranial nerve deficit.      Deep Tendon Reflexes: Reflexes are normal and symmetric.   Psychiatric:         Behavior: Behavior normal.         Thought Content: Thought content normal.         Judgment: Judgment normal.             ASSESSMENT AND PLAN:   Patient is a 65 year old female who presents primarily presents for:    Assessment and Plan:    1. Prediabetes:     - Previous blood sugar levels slightly elevated     - Patient reports eating 5 grams of sugar at 11:00 today     - Repeat fasting blood glucose test today     - Patient instructed to go to the lab for fasting tests    2. Hypothyroidism:     - Patient on levothyroxine     - Previous thyroid function tests reported as good     - Continue current levothyroxine regimen     - Repeat  thyroid function tests today    3. Chronic headache:     - Patient reports waking up with head pain every day     - Previously saw neurologist 5-6 years ago, took medication for 5 years without improvement     - Pain described as not strong and only present in the morning     - Clinician suggests sleep position or pillow as potential causes     - Prescribe gabapentin 100 mg orally once daily in the morning     - Patient informed that gabapentin can be taken without food    4. Vertigo:     - Patient reports vertigo as a current problem     - Prescribe gabapentin 100 mg orally once daily in the morning (same as for headache)     - Informed patient that gabapentin may help with vertigo    5. Night sweats:     - Patient reports feeling wet from the neck up upon waking, requiring a towel on the pillow     - Uses CPAP machine intermittently for sleep     - Clinician suggests possible relation to sleep apnea when CPAP is not used     - Monitor symptoms in relation to CPAP use     - Further evaluation pending blood test results    6. Preventive care:     - Last colonoscopy was 5 years ago and was normal     - Pap smear was done in June of the previous year     - Patient has not had a DEXA scan     - Annual mammograms have been performed     - Order DEXA scan     - Continue annual mammograms     - Repeat colonoscopy in 5 years (date to be determined)     - Next Pap smear due in approximately 2 years    7. Cataracts:     - Patient reports having cataracts in both eyes, diagnosed by ophthalmologist Dr. Mendez     - Surgery recommended and can be scheduled at patient's discretion2     - Patient to decide on timing for cataract surgery    Medications:  - Levothyroxine (current regimen)  - Gabapentin 100 mg orally once daily in the morning (new prescription)        Health Maintenance:  Health Maintenance   Topic Date Due    Mammogram  11/29/2023    COVID-19 Vaccine (5 - 2024-25 season) 09/01/2024    Annual Well Visit  Never  done    DEXA Scan  Never done    Colorectal Cancer Screening  10/23/2025    Pap Smear  06/04/2027    Influenza Vaccine  Completed    Annual Depression Screening  Completed    Fall Risk Screening (Annual)  Completed    Pneumococcal Vaccine: 50+ Years  Completed    Zoster Vaccines  Completed    Meningococcal B Vaccine  Aged Out             Meds & Refills for this Visit:  Requested Prescriptions      No prescriptions requested or ordered in this encounter       Orders Placed This Encounter   Procedures    TSH W Reflex To Free T4 [E]    CBC With Differential With Platelet    HCV Antibody    Hemoglobin A1C    Lipid Panel    Vitamin D    Comp Metabolic Panel (14) [E]    Prevnar 20 (PCV20) [43640]       Imaging & Consults:  GASTRO - INTERNAL  OBG - INTERNAL  PCV20 VACCINE FOR INTRAMUSCULAR USE  LEANNE PRADEEP 2D+3D SCREENING BILAT (CPT=77067/80795)  XR DEXA BONE DENSITOMETRY (NDY=22990)        Andrea Madden MD     No follow-ups on file.  Important issues to follow up on next visit      Patient indicates understanding of the above recommendations and agrees to the above plan.  Reasurrance and education provided. All questions answered.  Notified to call with any questions, complications, allergies, or worsening or changing symptoms as well as any side effects or complications from the treatments .  Red flags/ ER precautions discussed.    This note was produced using voice recognition software.  As a result, errors may occur.  When identified, these errors have been corrected.  While every attempt is made to correct errors during dictation, errors may still exist.    Total time spent was 70 minutes which includes: Preparation to see patient including chart review, reviewing appropriate medical history, counseling and education (diet and exercise), discussing treatment options, ordering appropriate diagnostic tests and documentation.      As part of our commitment to providing you with comprehensive, transparent, and timely  access to your health information, we adhere to the guidelines set forth by the 21st Century Cures Act. This Act enhances your rights to access your electronic health information and ensures that you can easily obtain your medical records.  Please note that the verbage used in this note is intended for medical documentation and communication and may be interpreted as forthright.  Please do not hesitate to contact my office if you have any questions.        Andrea Madden MD  Internal Medicine/Primary Care  [facility]                   [1] No Known Allergies

## 2025-03-12 ENCOUNTER — TELEPHONE (OUTPATIENT)
Age: 65
End: 2025-03-12

## 2025-03-12 NOTE — TELEPHONE ENCOUNTER
Results given and discussed with patient and recommendations were made. Patient verbalized understanding no further questions at this time.

## 2025-03-12 NOTE — TELEPHONE ENCOUNTER
----- Message from Andrea Madden sent at 3/11/2025  5:31 PM CDT -----  Inform of labs  - A1c in prediabetic range. Lipid panel with elevated LDL.  LDL (low-density lipoprotein) sometimes called \"bad\" cholesterol makes up the majority of your body's cholesterol.  High levels of LDL raise your risk of cardiovascular disease including heart disease and stroke.  Diets high in saturated fats, salts and cholesterol such as fatty meats, processed foods, dairy and cured meats can lead to elevated LDL levels.    Mild anemia on CBC.  Will monitor at next follow up.  Rest of labs with no sign abnormality.      Dr. Madden

## 2025-03-14 NOTE — PROGRESS NOTES
Patient prefers a Wednesday procedure.    Scheduled for:  Colonoscopy 02993  Provider Name:  Dr. Phillips  Date:  6/18/2025  Location:   [facility]  Sedation:  MAC  Time:  12:30 PM - Patient is aware [facility] will call the day before with arrival time.  Prep:  Trilyte  Meds/Allergies Reconciled?:  APN reviewed   Diagnosis with codes:  Colon cancer screening Z12.11  Was patient informed to call insurance with codes (Y/N):  Yes, I confirmed [employer] insurance with the patient.   Referral sent?:  Referral was sent at the time of electronic surgical scheduling.   [facility] or [facility] notified?:  I sent an electronic request to Endo Scheduling and received a confirmation today.   Medication Orders:  Hold multivitamins/supplements one week prior to procedure.  Misc Orders:  N/A     Further instructions given by staff:  I discussed the prep instructions with the patient which she verbally understood and is aware that I will mail the instructions today.

## 2025-04-01 ENCOUNTER — HOSPITAL ENCOUNTER (OUTPATIENT)
Dept: BONE DENSITY | Age: 65
Discharge: HOME | End: 2025-04-01
Attending: INTERNAL MEDICINE
Payer: MEDICARE

## 2025-04-01 ENCOUNTER — HOSPITAL ENCOUNTER (OUTPATIENT)
Dept: MAMMOGRAPHY | Age: 65
Discharge: HOME | End: 2025-04-01
Attending: INTERNAL MEDICINE
Payer: MEDICARE

## 2025-04-01 DIAGNOSIS — Z78.0 POSTMENOPAUSAL: ICD-10-CM

## 2025-04-01 DIAGNOSIS — Z12.31 ENCOUNTER FOR SCREENING MAMMOGRAM FOR MALIGNANT NEOPLASM OF BREAST: ICD-10-CM

## 2025-04-01 DIAGNOSIS — Z91.89 AT RISK FOR OSTEOPOROSIS: ICD-10-CM

## 2025-04-01 PROCEDURE — 77067 SCR MAMMO BI INCL CAD: CPT | Performed by: INTERNAL MEDICINE

## 2025-04-01 PROCEDURE — 77080 DXA BONE DENSITY AXIAL: CPT | Performed by: INTERNAL MEDICINE

## 2025-04-01 PROCEDURE — 77063 BREAST TOMOSYNTHESIS BI: CPT | Performed by: INTERNAL MEDICINE

## 2025-04-22 ENCOUNTER — TELEPHONE (OUTPATIENT)
Age: 65
End: 2025-04-22

## 2025-04-22 NOTE — TELEPHONE ENCOUNTER
Results were discussed with patient above, patient verbalized understanding no further questions at this time.

## 2025-04-22 NOTE — TELEPHONE ENCOUNTER
----- Message from Andrea Madden sent at 4/16/2025  8:49 AM CDT -----  Please inform that dexa scan returned normal.    ----- Message -----  From: Concha Oswald Rad In  Sent: 4/3/2025  11:57 AM CDT  To: Andrea Madden MD

## 2025-05-28 ENCOUNTER — OFFICE VISIT (OUTPATIENT)
Age: 65
End: 2025-05-28
Payer: MEDICARE

## 2025-05-28 VITALS
WEIGHT: 217.81 LBS | DIASTOLIC BLOOD PRESSURE: 76 MMHG | OXYGEN SATURATION: 94 % | HEART RATE: 83 BPM | SYSTOLIC BLOOD PRESSURE: 126 MMHG | BODY MASS INDEX: 37.19 KG/M2 | HEIGHT: 64 IN

## 2025-05-28 DIAGNOSIS — G89.29 CHRONIC PAIN OF RIGHT KNEE: Primary | ICD-10-CM

## 2025-05-28 DIAGNOSIS — M25.561 CHRONIC PAIN OF RIGHT KNEE: Primary | ICD-10-CM

## 2025-05-28 DIAGNOSIS — M71.22 SYNOVIAL CYST OF LEFT POPLITEAL SPACE: ICD-10-CM

## 2025-05-28 PROCEDURE — 3074F SYST BP LT 130 MM HG: CPT | Performed by: INTERNAL MEDICINE

## 2025-05-28 PROCEDURE — 99214 OFFICE O/P EST MOD 30 MIN: CPT | Performed by: INTERNAL MEDICINE

## 2025-05-28 PROCEDURE — 3078F DIAST BP <80 MM HG: CPT | Performed by: INTERNAL MEDICINE

## 2025-05-28 PROCEDURE — 3008F BODY MASS INDEX DOCD: CPT | Performed by: INTERNAL MEDICINE

## 2025-05-28 NOTE — PROGRESS NOTES
[facility] part of [facility]  Return Patient Progress Note      HPI:     Chief Complaint   Patient presents with    Knee Pain     Pain in both knees       Coby Clemente is a 65 year old female presenting for:    Has a significant  has a past medical history of Anemia, Healthy adult on routine physical examination, Hypothyroidism, Internal hemorrhoids (10/23/15), Obesity, Pain in soft tissues of limb, URI (upper respiratory infection), and Uterine myoma.    Pain in both knees primarily however in the left knee.  Some swelling behind her left knee which is really starting to cause some pain for her.  She reports that she felt that this came about after extended walking while out of town.      Labs:   CMP:  Lab Results   Component Value Date/Time     03/10/2025 08:29 AM    K 4.2 03/10/2025 08:29 AM     03/10/2025 08:29 AM    CO2 25.0 03/10/2025 08:29 AM    CREATSERUM 0.77 03/10/2025 08:29 AM    CA 9.0 03/10/2025 08:29 AM    GLU 93 03/10/2025 08:29 AM    TP 6.9 03/10/2025 08:29 AM    ALB 4.3 03/10/2025 08:29 AM    ALKPHO 95 03/10/2025 08:29 AM    AST 23 03/10/2025 08:29 AM    ALT 18 03/10/2025 08:29 AM    BILT 0.5 03/10/2025 08:29 AM    TSH 1.520 03/10/2025 08:29 AM    T4F 1.18 06/30/2016 06:25 PM        CBC:  Lab Results   Component Value Date    WBC 4.7 03/10/2025    HGB 11.7 (L) 03/10/2025    HCT 35.6 03/10/2025    .0 03/10/2025    NEPERCENT 62.5 03/10/2025    LYPERCENT 26.6 03/10/2025    MOPERCENT 8.2 03/10/2025    EOPERCENT 1.7 03/10/2025    BAPERCENT 0.8 03/10/2025    NE 2.95 03/10/2025    LYMABS 1.26 03/10/2025    MOABSO 0.39 03/10/2025    EOABSO 0.08 03/10/2025    BAABSO 0.04 03/10/2025          Hemoglobin A1C, Microalbumin  Lab Results   Component Value Date/Time    A1C 5.8 (H) 03/10/2025 08:29 AM        Lipid panel  Lab Results   Component Value Date/Time    CHOLEST 226 (H) 03/10/2025 08:29 AM    HDL 74 (H) 03/10/2025 08:29 AM    TRIG 147 03/10/2025 08:29 AM      (H) 03/10/2025 08:29 AM    NONHDLC 152 (H) 03/10/2025 08:29 AM        Medications:  Current Medications[1]   PMH:  Past Medical History[2]            REVIEW OF SYSTEMS:   Review of Systems   Constitutional:  Negative for chills, fatigue, fever and unexpected weight change.   HENT:  Negative for congestion, ear pain, hearing loss, rhinorrhea, sinus pain and sore throat.    Eyes:  Negative for pain, redness and visual disturbance.   Respiratory:  Negative for apnea, cough, chest tightness, shortness of breath and wheezing.    Cardiovascular:  Negative for chest pain, palpitations and leg swelling.   Gastrointestinal:  Negative for abdominal distention, abdominal pain, blood in stool, constipation and nausea.   Endocrine: Negative for cold intolerance, heat intolerance and polyuria.   Genitourinary:  Negative for dysuria, hematuria and urgency.   Musculoskeletal:  Negative for arthralgias, back pain, gait problem, joint swelling, myalgias and neck pain.        Knee pain    Skin:  Negative for rash and wound.   Allergic/Immunologic: Negative for food allergies and immunocompromised state.   Neurological:  Negative for dizziness, seizures, facial asymmetry, speech difficulty, weakness, light-headedness, numbness and headaches.   Hematological:  Negative for adenopathy. Does not bruise/bleed easily.   Psychiatric/Behavioral:  Negative for behavioral problems, sleep disturbance and suicidal ideas. The patient is not nervous/anxious.             PHYSICAL EXAM:   /76   Pulse 83   Ht 5' 4\" (1.626 m)   Wt 217 lb 12.8 oz (98.8 kg)   SpO2 94%   BMI 37.39 kg/m²  Estimated body mass index is 37.39 kg/m² as calculated from the following:    Height as of this encounter: 5' 4\" (1.626 m).    Weight as of this encounter: 217 lb 12.8 oz (98.8 kg).     Wt Readings from Last 3 Encounters:   05/28/25 217 lb 12.8 oz (98.8 kg)   07/13/22 207 lb (93.9 kg)   04/15/22 206 lb (93.4 kg)       Physical Exam  Vitals reviewed.    Constitutional:       General: She is not in acute distress.     Appearance: She is well-developed.   HENT:      Head: Normocephalic and atraumatic.   Eyes:      Conjunctiva/sclera: Conjunctivae normal.      Pupils: Pupils are equal, round, and reactive to light.   Neck:      Thyroid: No thyromegaly.   Cardiovascular:      Rate and Rhythm: Normal rate and regular rhythm.      Heart sounds: Normal heart sounds, S1 normal and S2 normal. No murmur heard.     No friction rub. No gallop.   Pulmonary:      Effort: Pulmonary effort is normal. No respiratory distress.      Breath sounds: Normal breath sounds. No wheezing or rales.   Chest:      Chest wall: No tenderness.   Abdominal:      General: Bowel sounds are normal. There is no distension.      Palpations: Abdomen is soft. There is no mass.      Tenderness: There is no abdominal tenderness. There is no guarding or rebound.   Musculoskeletal:         General: No tenderness. Normal range of motion.      Cervical back: Normal range of motion.      Comments: Swelling behing left knee     Lymphadenopathy:      Cervical: No cervical adenopathy.   Skin:     General: Skin is warm.      Findings: No erythema or rash.   Neurological:      Mental Status: She is alert and oriented to person, place, and time.      Cranial Nerves: No cranial nerve deficit.      Deep Tendon Reflexes: Reflexes are normal and symmetric.   Psychiatric:         Behavior: Behavior normal.         Thought Content: Thought content normal.         Judgment: Judgment normal.               ASSESSMENT AND PLAN:   Patient is a 65 year old female who presents primarily presents for:    (M25.561,  G89.29) Chronic pain of right knee  (primary encounter diagnosis)  Plan: US LEG LEFT LIMITED (CPT=76882)            (M71.22) Synovial cyst of left popliteal space  Plan: US LEG LEFT LIMITED (CPT=76882), Physiatry         Referral - In Network          Swelling behind left knee appears to be a likely Baker's cyst.   Supportive treatment with an Ace bandage and elevation recommended.  If no improvement recommend that she complete an ultrasound of her left leg.  Schedule with physiatry also to discuss further treatment options                Health Maintenance:    Health Maintenance   Topic Date Due    COVID-19 Vaccine (5 - 2024-25 season) 09/01/2024    Annual Well Visit  Never done    Colorectal Cancer Screening  10/23/2025    Mammogram  04/01/2026    Pap Smear  06/04/2027    Influenza Vaccine  Completed    DEXA Scan  Completed    Annual Depression Screening  Completed    Fall Risk Screening (Annual)  Completed    Pneumococcal Vaccine: 50+ Years  Completed    Zoster Vaccines  Completed    Meningococcal B Vaccine  Aged Out         Meds & Refills for this Visit:  Requested Prescriptions      No prescriptions requested or ordered in this encounter       No orders of the defined types were placed in this encounter.      Imaging & Consults:  None          No follow-ups on file.  Important follow up notes/labs for next visit      Patient indicates understanding of the above recommendations and agrees to the above plan.  Reasurrance and education provided. All questions answered.    Notified to call with any questions, complications, allergies, or worsening or changing symptoms as well as any side effects or complications from the treatments .  Red flags/ ER precautions discussed.    If diagnostic labs or imaging ordered advised patient to contact my office for results  24-48 hours after completion    This note was dictated using dragon speech recognition transcription software.  Typographical and transcription errors may be present.  Please call if any questions.       As part of our commitment to providing you with comprehensive, transparent, and timely access to your health information, we adhere to the guidelines set forth by the 21st Century Cures Act. This Act enhances your rights to access your electronic health information and  ensures that you can easily obtain your medical records.  Please note that the verbage used in this note is intended for medical documentation and communication and may be interpreted as forthright.  Please do not hesitate to contact my office if you have any questions.            Andrea Madden MD  [facility]                         [1]   Current Outpatient Medications   Medication Sig Dispense Refill    terbinafine 250 MG Oral Tab Take 1 tablet (250 mg total) by mouth daily.      Levothyroxine Sodium 75 MCG Oral Tab Take 1 tablet (75 mcg total) by mouth before breakfast. 90 tablet 1    PEG 3350-KCl-Na Bicarb-NaCl (TRILYTE) 420 g Oral Recon Soln Take prep as directed by gastro office. May substitute with Trilyte/generic equivalent if needed. (Patient not taking: Reported on 5/28/2025) 4000 mL 0   [2]   Past Medical History:   Anemia    Healthy adult on routine physical examination    resolved    Hypothyroidism    Internal hemorrhoids    Obesity    Pain in soft tissues of limb    resolved    URI (upper respiratory infection)    resolved    Uterine myoma

## 2025-06-12 RX ORDER — ASCORBIC ACID 500 MG
TABLET ORAL
COMMUNITY
End: 2025-06-12

## 2025-06-14 ENCOUNTER — APPOINTMENT (OUTPATIENT)
Dept: GENERAL RADIOLOGY | Facility: HOSPITAL | Age: 65
End: 2025-06-14
Attending: EMERGENCY MEDICINE
Payer: MEDICARE

## 2025-06-14 ENCOUNTER — HOSPITAL ENCOUNTER (EMERGENCY)
Facility: HOSPITAL | Age: 65
Discharge: HOME | End: 2025-06-15
Attending: EMERGENCY MEDICINE
Payer: MEDICARE

## 2025-06-14 VITALS
OXYGEN SATURATION: 99 % | RESPIRATION RATE: 18 BRPM | TEMPERATURE: 98 F | HEART RATE: 72 BPM | SYSTOLIC BLOOD PRESSURE: 175 MMHG | DIASTOLIC BLOOD PRESSURE: 77 MMHG

## 2025-06-14 DIAGNOSIS — S86.812A STRAIN OF LEFT CALF MUSCLE: Primary | ICD-10-CM

## 2025-06-14 PROCEDURE — 99284 EMERGENCY DEPT VISIT MOD MDM: CPT

## 2025-06-14 PROCEDURE — 73560 X-RAY EXAM OF KNEE 1 OR 2: CPT | Performed by: EMERGENCY MEDICINE

## 2025-06-14 PROCEDURE — 99283 EMERGENCY DEPT VISIT LOW MDM: CPT

## 2025-06-14 PROCEDURE — 73610 X-RAY EXAM OF ANKLE: CPT | Performed by: EMERGENCY MEDICINE

## 2025-06-14 PROCEDURE — 73630 X-RAY EXAM OF FOOT: CPT | Performed by: EMERGENCY MEDICINE

## 2025-06-14 RX ORDER — IBUPROFEN 600 MG/1
600 TABLET, FILM COATED ORAL ONCE
Status: COMPLETED | OUTPATIENT
Start: 2025-06-14 | End: 2025-06-14

## 2025-06-15 RX ORDER — HYDROCODONE BITARTRATE AND ACETAMINOPHEN 5; 325 MG/1; MG/1
1 TABLET ORAL EVERY 6 HOURS PRN
Qty: 16 TABLET | Refills: 0 | Status: SHIPPED | OUTPATIENT
Start: 2025-06-15 | End: 2025-06-22

## 2025-06-15 NOTE — ED INITIAL ASSESSMENT (HPI)
Pt arrived c/o fall & left knee pain after missing a step with her right foot, felt she pulled something on her left leg. Denies headstrike, no loc. Pt has pain issues behind her left knee, has an appt for an xr for this. Pt states she cannot put weight on it, hurts too much to bend. Took 1000mg of tylenol 30min pta. Pt is A&Ox4.

## 2025-06-15 NOTE — DISCHARGE INSTRUCTIONS
Please follow up with your doctor as you may benefit from an ultrasound to look for a bakers cyst behind your knee

## 2025-06-15 NOTE — ED PROVIDER NOTES
Patient Seen in: [facility] Emergency Department    History     Chief Complaint   Patient presents with    Fall    Foot Pain       HPI    65-year-old female presents to the ER for evaluation of left lower leg pain.  Son is at bedside translating and states that patient had injured her left calf about a week earlier when she had slipped.  She had not fallen but since then she has had some calf pain.  She missed stepped with her other foot today and now she is having even more pain in her left leg but it is a little bit lower and lateral in the back of her leg rather than medial and superior in the calf as previous.  Patient did take some Tylenol prior to ED arrival.  No numbness or tingling    History from Independent Source: Son gave history due to language barrier    External Records Reviewed: On chart review, patient saw primary care on May 28 for bilateral knee pain worse in the left.  She had an ultrasound ordered of the left lower extremity which has not been completed as of yet.  There was some concern about possible swelling behind the knee.    History reviewed. Past Medical History[1]    History reviewed. Past Surgical History[2]      Medications :  Prescriptions Prior to Admission[3]     Family History[4]    Smoking Status: Social Hx on file[5]    Constitutional and vital signs reviewed.      Social History and Family History elements reviewed from today, pertinent positives to the presenting problem noted.    Physical Exam     ED Triage Vitals [06/14/25 2125]   BP (!) 184/79   Pulse 79   Resp 19   Temp 97.9 °F (36.6 °C)   Temp src Temporal   SpO2 97 %   O2 Device None (Room air)       Physical Exam   Constitutional: AAOx3, well nourished, NAD  HEENT: Normocephalic, PERRLA, MMM  CV: s1s2+, RRR, no m/r/g, normal distal pulses  Pulmonary/Chest: CTA b/l with no rales, wheezes.  No chest wall tenderness  Abdominal: Nontender.  Nondistended. Soft. Bowel sounds are normal.   Neck/Back:   :    Musculoskeletal: Mild tenderness to palpation in the proximal left calf as well as distal lateral calf.  No significant edema noted.  No erythema.  Normal range of motion. No deformity.   Neurological: Awake, alert. Normal reflexes. No cranial nerve deficit.    Skin: Skin is warm and dry. No rash noted. No erythema.   Psychiatric:      All measures to prevent infection transmission during my interaction with the patient were taken. The patient was already wearing a droplet mask on my arrival to the room. Personal protective equipment was worn throughout the duration of the exam.      ED Course      Labs Reviewed - No data to display  My Independent Interpretation of EKG (if performed):     Imaging Results Available and Reviewed while in ED: No results found.  ED Medications Administered:   Medications   ibuprofen (Motrin) tab 600 mg (600 mg Oral Given 6/14/25 2305)             MDM     Vitals:    06/14/25 2125 06/14/25 2215   BP: (!) 184/79 (!) 175/77   Pulse: 79 72   Resp: 19 18   Temp: 97.9 °F (36.6 °C)    TempSrc: Temporal    SpO2: 97% 99%     *I personally reviewed and interpreted all ED vitals.    Independent Interpretation of Studies: I have independently reviewed left knee, ankle, foot x-rays.  There are no significant acute findings    Social Determinants of Health:     Procedures:      Differential/MDM/Shared Decision Making: Differential Diagnosis includes muscular strain, Baker's cyst, fracture, others.      The patient already  has a past medical history of Anemia, Disorder of thyroid, Healthy adult on routine physical examination, Hypothyroidism, Internal hemorrhoids (10/23/2015), Obesity, Osteoarthritis, Pain in soft tissues of limb, URI (upper respiratory infection), Uterine myoma, and Visual impairment.  to contribute to the complexity of this ED evaluation.           Medications, Diagnostics, or Disposition considered but not done:     Patient likely has muscular strain as well as possibility of  Morel's cyst.  Management of case was discussed with patient and son.  Will discharge with follow-up with their doctor for their ultrasound.      Condition upon leaving the department: Stable    Disposition and Plan     Clinical Impression:  1. Strain of left calf muscle        Disposition:  Discharge    Follow-up:  Andrea Madden MD  [facility]  [address] [facility] [address]  [phone]    Call        Medications Prescribed:  Current Discharge Medication List        START taking these medications    Details   HYDROcodone-acetaminophen 5-325 MG Oral Tab Take 1 tablet by mouth every 6 (six) hours as needed for Pain.  Qty: 16 tablet, Refills: 0    Associated Diagnoses: Strain of left calf muscle                      [1]   Past Medical History:   Anemia    Disorder of thyroid    Healthy adult on routine physical examination    resolved    Hypothyroidism    Internal hemorrhoids    Obesity    Osteoarthritis    Pain in soft tissues of limb    resolved    URI (upper respiratory infection)    resolved    Uterine myoma    Visual impairment    Glasses   [2]   Past Surgical History:  Procedure Laterality Date    Breast biopsy Right 2009    benign final esults          Colonoscopy  10/23/15    internal hemorrhoids    [place] localization wire 1 site right (cpt=19281)      Other surgical history  3/2012    cauterization of fibroid   [3] (Not in a hospital admission)   [4]   Family History  Problem Relation Age of Onset    Diabetes Mother     Hypertension Mother     Arthritis Father     No Known Problems Daughter     No Known Problems Son     No Known Problems Son    [5]   Social History  Socioeconomic History    Marital status:    Tobacco Use    Smoking status: Never    Smokeless tobacco: Never   Vaping Use    Vaping status: Never Used   Substance and Sexual Activity    Alcohol use: No    Drug use: No    Sexual activity: Yes

## 2025-06-16 ENCOUNTER — TELEPHONE (OUTPATIENT)
Age: 65
End: 2025-06-16

## 2025-06-16 NOTE — TELEPHONE ENCOUNTER
Pt is calling wanting to let dr. Madden know that on 06/14 went to er due to pain on leg getting worse. Pt mention that dr. Madden believe pt had a cyst and doctor had send pt to see Dr. Abdirahman Bailey, pt has appointment on 06/23. Pt is aware that Dr. Madden is not in the office.

## 2025-06-17 ENCOUNTER — TELEPHONE (OUTPATIENT)
Facility: CLINIC | Age: 65
End: 2025-06-17

## 2025-06-17 NOTE — TELEPHONE ENCOUNTER
Patient would like to go over the instructions for taking the colonoscopy preparation for tomorrows procedure.

## 2025-06-18 ENCOUNTER — ANESTHESIA (OUTPATIENT)
Dept: ENDOSCOPY | Facility: HOSPITAL | Age: 65
End: 2025-06-18
Payer: MEDICARE

## 2025-06-18 ENCOUNTER — HOSPITAL ENCOUNTER (OUTPATIENT)
Facility: HOSPITAL | Age: 65
Setting detail: HOSPITAL OUTPATIENT SURGERY
Discharge: HOME | End: 2025-06-18
Attending: INTERNAL MEDICINE | Admitting: INTERNAL MEDICINE
Payer: MEDICARE

## 2025-06-18 ENCOUNTER — ANESTHESIA EVENT (OUTPATIENT)
Dept: ENDOSCOPY | Facility: HOSPITAL | Age: 65
End: 2025-06-18
Payer: MEDICARE

## 2025-06-18 VITALS
RESPIRATION RATE: 13 BRPM | HEIGHT: 64 IN | WEIGHT: 217 LBS | SYSTOLIC BLOOD PRESSURE: 148 MMHG | DIASTOLIC BLOOD PRESSURE: 64 MMHG | BODY MASS INDEX: 37.05 KG/M2 | OXYGEN SATURATION: 99 % | HEART RATE: 59 BPM

## 2025-06-18 DIAGNOSIS — Z12.11 COLON CANCER SCREENING: ICD-10-CM

## 2025-06-18 PROBLEM — K63.5 POLYP OF COLON: Status: ACTIVE | Noted: 2025-06-18

## 2025-06-18 PROCEDURE — 45385 COLONOSCOPY W/LESION REMOVAL: CPT | Performed by: INTERNAL MEDICINE

## 2025-06-18 RX ORDER — SODIUM CHLORIDE, SODIUM LACTATE, POTASSIUM CHLORIDE, CALCIUM CHLORIDE 600; 310; 30; 20 MG/100ML; MG/100ML; MG/100ML; MG/100ML
INJECTION, SOLUTION INTRAVENOUS CONTINUOUS
Status: DISCONTINUED | OUTPATIENT
Start: 2025-06-18 | End: 2025-06-18

## 2025-06-18 RX ORDER — LIDOCAINE HYDROCHLORIDE 20 MG/ML
INJECTION, SOLUTION EPIDURAL; INFILTRATION; INTRACAUDAL; PERINEURAL AS NEEDED
Status: DISCONTINUED | OUTPATIENT
Start: 2025-06-18 | End: 2025-06-18 | Stop reason: SURG

## 2025-06-18 RX ORDER — NALOXONE HYDROCHLORIDE 0.4 MG/ML
0.08 INJECTION, SOLUTION INTRAMUSCULAR; INTRAVENOUS; SUBCUTANEOUS ONCE AS NEEDED
Status: DISCONTINUED | OUTPATIENT
Start: 2025-06-18 | End: 2025-06-18

## 2025-06-18 RX ADMIN — SODIUM CHLORIDE, SODIUM LACTATE, POTASSIUM CHLORIDE, CALCIUM CHLORIDE: 600; 310; 30; 20 INJECTION, SOLUTION INTRAVENOUS at 10:04:00

## 2025-06-18 RX ADMIN — SODIUM CHLORIDE, SODIUM LACTATE, POTASSIUM CHLORIDE, CALCIUM CHLORIDE: 600; 310; 30; 20 INJECTION, SOLUTION INTRAVENOUS at 10:28:00

## 2025-06-18 RX ADMIN — LIDOCAINE HYDROCHLORIDE 40 MG: 20 INJECTION, SOLUTION EPIDURAL; INFILTRATION; INTRACAUDAL; PERINEURAL at 10:06:00

## 2025-06-18 NOTE — DISCHARGE INSTRUCTIONS
Home Care Instructions for Colonoscopy  Diet:  - Resume your regular diet as tolerated unless otherwise instructed.  - Start with light meals to minimize bloating.  - Do not drink alcohol today.    Medication:  - If you have questions about resuming your normal medications, please contact your Primary Care Physician.    Activities:  - Take it easy today. Do not return to work today.  - Do not drive today.  - Do not operate any machinery today (including kitchen equipment).    Colonoscopy:  - You may notice some rectal \"spotting\" (a little blood on the toilet tissue) for a day or two after the exam. This is normal.  - If you experience any rectal bleeding (not spotting), persistent tenderness or sharp severe abdominal pains, oral temperature over 100 degrees Fahrenheit, light-headedness or dizziness, or any other problems, contact your doctor.      **If unable to reach your doctor, please go to the [facility] Emergency Room**    - Your referring physician will receive a full report of your examination.  - If you do not hear from your doctor's office within two weeks of your biopsy, please call them for your results.    You may be able to see your laboratory results in [employer] between 4 and 7 business days.  In some cases, your physician may not have viewed the results before they are released to [employer].  If you have questions regarding your results contact the physician who ordered the test/exam by phone or via [employer] by choosing \"Ask a Medical Question.\"

## 2025-06-18 NOTE — ANESTHESIA POSTPROCEDURE EVALUATION
Patient: Coby Clemente    Procedure Summary       Date: 06/18/25 Room / Location: [facility] ENDOSCOPY 02 / [facility] ENDOSCOPY    Anesthesia Start: 1004 Anesthesia Stop:     Procedure: COLONOSCOPY Diagnosis:       Colon cancer screening      (Colon polyps, hemorrhoids)    Surgeons: LIAM Phillips MD Anesthesiologist: Veronica Irizarry CRNA    Anesthesia Type: general, MAC ASA Status: 1            Anesthesia Type: general, MAC    Vitals Value Taken Time   /66 06/18/25 10:32   Temp  06/18/25 10:32   Pulse 73 06/18/25 10:31   Resp 20 06/18/25 10:31   SpO2 100 % 06/18/25 10:31   Vitals shown include unfiled device data.    [facility] AN Post Evaluation:   Patient Evaluated in Patient location: Endo recovery.  Patient Participation: complete - patient participated  Level of Consciousness: awake and alert  Pain Score: 0  Pain Management: adequate  Airway Patency:patent  Yes    Nausea/Vomiting: none  Cardiovascular Status: acceptable  Respiratory Status: acceptable  Postoperative Hydration acceptable      Veronica Irizarry CRNA  6/18/2025 10:32 AM

## 2025-06-18 NOTE — H&P
History & Physical Examination    Patient Name: Coby Clemente  MRN: [id number]  [facility]: [id number]  YOB: 1960    Diagnosis: screening for colon cancer    Prescriptions Prior to Admission[1]  Current Hospital Medications[2]    Allergies: Allergies[3]    Past Medical History[4]  Past Surgical History[5]  Family History[6]  Social History     Tobacco Use    Smoking status: Never    Smokeless tobacco: Never   Substance Use Topics    Alcohol use: No       SYSTEM Check if Review is Normal Check if Physical Exam is Normal If not normal, please explain:   HEENT [X ] [ X]    NECK  [X ] [ X]    HEART [X ] [ X]    LUNGS [X ] [ X]    ABDOMEN [X ] [ X]    EXTREMITIES [X ] [ X]    OTHER        I have discussed the risks and benefits and alternatives of the procedure with the patient/family.  They understand and agree to proceed with plan of care.   I have reviewed the History and Physical done within the last 30 days.  Any changes noted above.    DYLLAN Phillips MD  [facility] - Gastroenterology  6/18/2025  10:30 AM                 [1]   Medications Prior to Admission   Medication Sig Dispense Refill Last Dose/Taking    Omega-3 Fatty Acids (OMEGA-3 FISH OIL OR) Take by mouth.   6/13/2025    Calcium Carbonate (CALCIUM 500 OR) Take by mouth.   6/12/2025    Levothyroxine Sodium 75 MCG Oral Tab Take 1 tablet (75 mcg total) by mouth before breakfast. 90 tablet 1 6/17/2025    HYDROcodone-acetaminophen 5-325 MG Oral Tab Take 1 tablet by mouth every 6 (six) hours as needed for Pain. 16 tablet 0     PEG 3350-KCl-Na Bicarb-NaCl (TRILYTE) 420 g Oral Recon Soln Take prep as directed by gastro office. May substitute with Trilyte/generic equivalent if needed. 4000 mL 0    [2]   Current Facility-Administered Medications   Medication Dose Route Frequency    lactated ringers infusion   Intravenous Continuous     Facility-Administered Medications Ordered in Other Encounters   Medication Dose Route Frequency    lidocaine PF  (Xylocaine-MPF) 2% injection   Intravenous PRN    propofol (Diprivan) 10 MG/ML injection   Intravenous PRN    propofol (Diprivan) 10 mg/mL infusion premix   Intravenous Continuous PRN   [3] No Known Allergies  [4]   Past Medical History:   Anemia    Disorder of thyroid    Healthy adult on routine physical examination    resolved    Hypothyroidism    Internal hemorrhoids    Obesity    Osteoarthritis    Pain in soft tissues of limb    resolved    URI (upper respiratory infection)    resolved    Uterine myoma    Visual impairment    Glasses   [5]   Past Surgical History:  Procedure Laterality Date    Breast biopsy Right 2009    benign final esults      1984    Colonoscopy  10/23/2015    internal hemorrhoids    Colonoscopy N/A 2025    LIAM Phillips MD    [facility] localization wire 1 site right (cpt=19281)      Other surgical history  2012    cauterization of fibroid   [6]   Family History  Problem Relation Age of Onset    Diabetes Mother     Hypertension Mother     Arthritis Father     No Known Problems Daughter     No Known Problems Son     No Known Problems Son

## 2025-06-18 NOTE — ANESTHESIA PREPROCEDURE EVALUATION
Anesthesia PreOp Note    HPI:     Coby Clemente is a 65 year old female who presents for preoperative consultation requested by: LIAM Phillips MD    Date of Surgery: 6/18/2025    Procedure(s):  COLONOSCOPY  Indication: Colon cancer screening    Relevant Problems   No relevant active problems       NPO:  Last Liquid Consumption Date: 06/18/25  Last Liquid Consumption Time: 0600  Last Solid Consumption Date: 06/17/25  Last Solid Consumption Time: 1200  Last Liquid Consumption Date: 06/18/25          History Review:  Patient Active Problem List    Diagnosis Date Noted   • Mild sprain of left ankle 03/09/2018   • Calcaneal spur of left foot 03/09/2018   • BMI 37.0-37.9, adult 07/25/2017   • Hypothyroidism 06/22/2016   • Non morbid obesity due to excess calories 06/22/2016   • Hypercholesteremia 06/22/2016   • Internal hemorrhoids 10/23/2015       Past Medical History[1]    Past Surgical History[2]    Prescriptions Prior to Admission[3]  Current Medications and Prescriptions Ordered in Epic[4]    Allergies[5]    Family History[6]  Social Hx on file[7]    Available pre-op labs reviewed.             Vital Signs:  Body mass index is 37.25 kg/m².   height is 1.626 m (5' 4\") and weight is 98.4 kg (217 lb). Her blood pressure is 158/68 and her pulse is 64. Her respiration is 14 and oxygen saturation is 98%.   Vitals:    06/12/25 1054 06/18/25 0954   BP:  158/68   Pulse:  64   Resp:  14   SpO2:  98%   Weight: 98.4 kg (217 lb)    Height: 1.626 m (5' 4\")         Anesthesia Evaluation     Patient summary reviewed and Nursing notes reviewed    Airway   Mallampati: I  TM distance: >3 FB  Neck ROM: full  Dental      Pulmonary - normal exam   Cardiovascular - normal exam  Exercise tolerance: good    Neuro/Psych      GI/Hepatic/Renal    (+) bowel prep    Endo/Other    Abdominal                Anesthesia Plan:   ASA:  1  Plan:   General and MAC  Plan Comments: Discussed anesthetic risks such as but, not limited to, CVA, MI,  dental damage, awareness and aspiration; verbalizes understanding and wishes to proceed.  The anesthetic dental exam does not represent a complete dental/oral exam. Notations on the dental diagram can help to highlight areas of concern and may no reflect all findings.  Informed Consent Plan and Risks Discussed With:  Patient  Discussed plan with:  Attending    I have informed Coby Clemente and/or legal guardian or family member of the nature of the anesthetic plan, benefits, risks including possible dental damage if relevant, major complications, and any alternative forms of anesthetic management.   All of the patient's questions were answered to the best of my ability. The patient desires the anesthetic management as planned.  Veronica Irizarry CRNA  2025 10:03 AM  Present on Admission:  **None**             [1]  Past Medical History:  • Anemia   • Disorder of thyroid   • Healthy adult on routine physical examination    resolved   • Hypothyroidism   • Internal hemorrhoids   • Obesity   • Osteoarthritis   • Pain in soft tissues of limb    resolved   • URI (upper respiratory infection)    resolved   • Uterine myoma   • Visual impairment    Glasses   [2]  Past Surgical History:  Procedure Laterality Date   • Breast biopsy Right     benign final esults   •      • Colonoscopy  10/23/15    internal hemorrhoids   • Molina localization wire 1 site right (cpt=19281)     • Other surgical history  3/2012    cauterization of fibroid   [3]  Medications Prior to Admission   Medication Sig Dispense Refill Last Dose/Taking   • Omega-3 Fatty Acids (OMEGA-3 FISH OIL OR) Take by mouth.   2025   • Calcium Carbonate (CALCIUM 500 OR) Take by mouth.   2025   • Levothyroxine Sodium 75 MCG Oral Tab Take 1 tablet (75 mcg total) by mouth before breakfast. 90 tablet 1 2025   • HYDROcodone-acetaminophen 5-325 MG Oral Tab Take 1 tablet by mouth every 6 (six) hours as needed for Pain. 16 tablet 0    • PEG  3350-KCl-Na Bicarb-NaCl (TRILYTE) 420 g Oral Recon Soln Take prep as directed by gastro office. May substitute with Trilyte/generic equivalent if needed. 4000 mL 0    [4]  Current Facility-Administered Medications Ordered in Epic   Medication Dose Route Frequency Provider Last Rate Last Admin   • lactated ringers infusion   Intravenous Continuous LIAM Phillips MD 20 mL/hr at 06/18/25 1000 New Bag at 06/18/25 1000     No current [facility]-ordered outpatient medications on file.   [5]  No Known Allergies  [6]  Family History  Problem Relation Age of Onset   • Diabetes Mother    • Hypertension Mother    • Arthritis Father    • No Known Problems Daughter    • No Known Problems Son    • No Known Problems Son    [7]  Social History  Socioeconomic History   • Marital status:    Tobacco Use   • Smoking status: Never   • Smokeless tobacco: Never   Vaping Use   • Vaping status: Never Used   Substance and Sexual Activity   • Alcohol use: No   • Drug use: No   • Sexual activity: Yes

## 2025-06-18 NOTE — OPERATIVE REPORT
COLONOSCOPY REPORT    Coby Clemente     1960 Age 65 year old   PCP Andrea Madden MD Endoscopist Jax Phillips MD     Date of procedure: 25    Procedure: Colonoscopy w/cold snare polypectomy    Pre-operative diagnosis: Screening    Post-operative diagnosis: Colon polyps x4, internal hemorrhoids    Medications: MAC    Withdrawal time: 13 minutes    Procedure:  Informed consent was obtained from the patient after the risks of the procedure were discussed, including but not limited to bleeding, perforation, aspiration, infection, or possibility of a missed lesion. After discussions of the risks/benefits and alternatives to this procedure, as well as the planned sedation, the patient was placed in the left lateral decubitus position and begun on continuous blood pressure pulse oximetry and EKG monitoring and this was maintained throughout the procedure. Once an adequate level of sedation was obtained a digital rectal exam was completed. Then the lubricated tip of the [id number] diagnostic video colonoscope was inserted and advanced without difficulty to the cecum using the CO2 insufflation technique. The cecum was identified by localizing the trifold, the appendix and the ileocecal valve. Withdrawal was begun with thorough washing and careful examination of the colonic walls and folds. A routine second examination of the cecum/ascending colon was performed. Photodocumentation was obtained. The bowel prep was good. Views of the colon were good with washing. I then carefully withdrew the instrument from the patient who tolerated the procedure well.     Complications: none.    Findings:   1. Four polyp(s) noted as follows:      A. 7 mm polyp in the ascending colon; sessile morphology; cold snare polypectomy and retrieved.      B. 8 mm polyp in the ascending colon; sessile morphology; cold snare polypectomy and retrieved.      C. 8 mm polyp in the ascending colon; sessile morphology;  cold snare polypectomy and retrieved.      D. 7 mm polyp in the descending colon; sessile morphology; cold snare polypectomy and retrieved.    2. Diverticulosis: none.    3. Terminal ileum: the visualized mucosa appeared normal.    4. The colonic mucosa throughout the colon showed normal vascular pattern, without evidence of angioectasias or inflammation.     5. A retroflexed view of the rectum revealed small internal hemorrhoids.    6. JAY: normal rectal tone, no masses palpated.     Impression:   Four small colon polyps removed.  Internal hemorrhoids, small.    Recommend:  Await pathology. The interval for the next colonoscopy will be determined after reviewing pathology. If new signs or symptoms develop, colonoscopy may need to be repeated sooner.   High fiber diet.  Monitor for blood in the stool. If having more than just tinge of blood, call office or go to the ER.    >>>If tissue was obtained and you have not received your pathology results either by phone or letter within 2 weeks, please call our office at [phone].    Specimens: colon  Blood loss: <1 ml      ----------------------------------------------------------------------------------------------------------------------------------    INTERVAL FOR COLONOSCOPY:   - If there is no family history of colon cancer and no colon polyps identified in an adequately prepped colon - colonoscopy should be repeated in 10 years. Various factors should be considered regarding repeat colonoscopy - including co-morbid conditions, ability to tolerate procedure with advanced age, and desire for repeat testing.   - If no colon polyps were identified and a positive family history of colon cancer - the colonoscopy should be repeated in 5 years.   - If colon polyps were removed, the colonoscopy should be repeated sooner depending on size/type/location of polyp.

## 2025-06-23 ENCOUNTER — OFFICE VISIT (OUTPATIENT)
Dept: PHYSICAL MEDICINE AND REHAB | Facility: CLINIC | Age: 65
End: 2025-06-23
Payer: MEDICARE

## 2025-06-23 ENCOUNTER — TELEPHONE (OUTPATIENT)
Dept: PHYSICAL MEDICINE AND REHAB | Facility: CLINIC | Age: 65
End: 2025-06-23

## 2025-06-23 VITALS — BODY MASS INDEX: 37.05 KG/M2 | HEIGHT: 64 IN | WEIGHT: 217 LBS

## 2025-06-23 DIAGNOSIS — M25.562 LEFT KNEE PAIN, UNSPECIFIED CHRONICITY: Primary | ICD-10-CM

## 2025-06-23 RX ORDER — TRIAMCINOLONE ACETONIDE 40 MG/ML
40 INJECTION, SUSPENSION INTRA-ARTICULAR; INTRAMUSCULAR ONCE
Status: COMPLETED | OUTPATIENT
Start: 2025-06-23 | End: 2025-06-23

## 2025-06-23 RX ORDER — LIDOCAINE HYDROCHLORIDE 10 MG/ML
8 INJECTION, SOLUTION INFILTRATION; PERINEURAL ONCE
Status: COMPLETED | OUTPATIENT
Start: 2025-06-23 | End: 2025-06-23

## 2025-06-23 RX ADMIN — TRIAMCINOLONE ACETONIDE 40 MG: 40 INJECTION, SUSPENSION INTRA-ARTICULAR; INTRAMUSCULAR at 10:45:00

## 2025-06-23 RX ADMIN — LIDOCAINE HYDROCHLORIDE 8 ML: 10 INJECTION, SOLUTION INFILTRATION; PERINEURAL at 10:44:00

## 2025-06-23 NOTE — PROGRESS NOTES
NEW PATIENT VISIT    CHIEF COMPLAINT  Left knee pain      HISTORY OF PRESENTING ILLNESS  Coby Clemente is a 65 year old female who presents for evaluation of Left knee pain.     History of Present Illness  Coby Clemente is a 65 year old female who presents with left knee pain.    She experiences pain in the left knee, primarily in the popliteal fossa, for more than two weeks. The pain feels like tightness and inflammation and has progressively worsened. Walking and twisting the knee exacerbate the pain, which is present with every step. There is no noticeable swelling or size difference compared to the other knee.    PAST MEDICAL HISTORY  Past Medical History[1]    PAST SURGICAL HISTORY  Past Surgical History[2]    MEDICATIONS  Medications Ordered Prior to Encounter[3]    ALLERGIES  Allergies[4]    SOCIAL HISTORY   reports that she has never smoked. She has never used smokeless tobacco. She reports that she does not drink alcohol and does not use drugs.    FAMILY HISTORY  Family History[5]    REVIEW OF SYSTEMS  Complete review of systems was performed and was negative except for those items stated in the History of Presenting Illness and Past Medical/Surgical History.    PHYSICAL EXAMINATION  GENERAL:  In no acute distress. Well-developed and well nourished.   SKIN: No rashes or open wounds involving bilateral lower extremities.  NEUROLOGIC:   Strength: 5/5 bilaterally with hip flexion, knee extension, knee flexion, ankle dorsiflexion, ankle eversion, ankle inversion, ankle plantarflexion, and great toe extension.   Sensation: intact light touch sensation throughout both lower extremities.   Reflexes: intact and symmetric in bilateral lower extremities. Babinski downgoing bilaterally. No clonus.   Gait: able to heel walk, toe walk, and perform tandem gait.   MUSCULOSKELETAL:  Physical Exam  MUSCULOSKELETAL: Tenderness in the left popliteal fossa. Decreased range of motion and flexion in the left knee. Pain  on twisting the left knee.        REVIEW OF PRIOR X-RAYS/STUDIES  Independently viewed the plain film radiographs of the left knee dated 2025 which reveals tricompartment knee osteoarthritis    IMPRESSION/DIAGNOSIS  Encounter Diagnoses   Name Primary?    Left knee pain, unspecified chronicity Yes    Baker's cyst of knee, left        TREATMENT/PLAN  Assessment & Plan  Left knee pain with decreased range of motion  Pain in popliteal fossa, worsened by walking and twisting. Suspected Baker's cyst. Limited flexion noted. Discussed ultrasound evaluation and potential intervention before therapy.  - Order ultrasound of left knee to evaluate for Baker's cyst.  - Perform drainage and injection if Morel's cyst is confirmed.  - Refer to physical therapy for knee strengthening and soft tissue work post-intervention.      Education was provided regarding the above impression/diagnosis and treatment options/plan were discussed.  All questions were answered during today's visit.  Patient will contact clinic if any other questions or concerns.            Abdirahman Bailey DO  Interventional Spine and Sports Medicine Specialist   Physical Medicine and Rehabilitation  [address] [address]    [address] [place] [id number]               [1]   Past Medical History:   Anemia    Disorder of thyroid    Healthy adult on routine physical examination    resolved    Hypothyroidism    Internal hemorrhoids    Obesity    Osteoarthritis    Pain in soft tissues of limb    resolved    URI (upper respiratory infection)    resolved    Uterine myoma    Visual impairment    Glasses   [2]   Past Surgical History:  Procedure Laterality Date    Breast biopsy Right 2009    benign final esults      1984    Colonoscopy  10/23/2015    internal hemorrhoids    Colonoscopy N/A 2025    LIAM Phillips MD    Colonoscopy N/A 2025     Procedure: COLONOSCOPY;  Surgeon: LIAM Phillips MD;  Location: [facility] ENDOSCOPY    Molina localization wire 1 site right (cpt=19281)      Other surgical history  03/01/2012    cauterization of fibroid   [3]   Current Outpatient Medications on File Prior to Visit   Medication Sig Dispense Refill    Omega-3 Fatty Acids (OMEGA-3 FISH OIL OR) Take by mouth.      Calcium Carbonate (CALCIUM 500 OR) Take by mouth.      Levothyroxine Sodium 75 MCG Oral Tab Take 1 tablet (75 mcg total) by mouth before breakfast. 90 tablet 1     No current facility-administered medications on file prior to visit.   [4] No Known Allergies  [5]   Family History  Problem Relation Age of Onset    Diabetes Mother     Hypertension Mother     Arthritis Father     No Known Problems Daughter     No Known Problems Son     No Known Problems Son

## 2025-06-23 NOTE — PROCEDURES
PROCEDURE NOTE    DIAGNOSIS  Left knee pain    PROCEDURE  Ultrasound guided Left knee injection    PERFORMING PHYSICIAN  Abdirahman Bailey DO    PROCEDURE NOTE  Informed consent was obtained. Risks and benefits of the procedure were explained. The patient was placed in a supine position with knee partially flexed. The left suprapatellar pouch was identified under ultrasound using the linear transducer. The area was prepped with Betadine x 3 and alcohol swab. Sterile ultrasound probe cover was used. Sterile ultrasound gel was applied. A 27-gauge 1.5 inch needle was inserted into this area and 1-2 mL of 1% lidocaine was infused subcutaneously to anesthetize the region. Then, the needle was advanced under ultrasound guidance to the target, using an in-plane approach. Then, 1 mL of 40 mg/mL Triamcinolone , 3 mL of 1% Lidocaine was infused. The patient tolerated the procedure without complications. Post procedure instructions were provided.    NO Bakers cyst noted on ultrasound evaluation.         Abdirahman Bailey DO  Physical Medicine and Rehabilitation / Sports Medicine   [facility]

## 2025-06-23 NOTE — TELEPHONE ENCOUNTER
Initiated authorization for Left knee baker cyst aspiration and injection, ultrasound guidance. CPT/HCPCS 74562,  dx:M25.562 / M71.22 with [employer] portal.    Status: No auth required    Authorization is not required based on medical necessity, however, is not a guarantee of payment and may be subject to review once claim is submitted.     PLEASE INFORM THE PATIENT TO CONTACT THE OFFICE IF THE INSURANCE CHANGES AS HE/SHE IS RESPONSIBLE TO UPDATE THE OFFICE IF INSURANCE CHANGES SO THAT PROCEDURE IS BILLED CORRECTLY.

## 2025-06-26 ENCOUNTER — TELEPHONE (OUTPATIENT)
Dept: PHYSICAL THERAPY | Facility: HOSPITAL | Age: 65
End: 2025-06-26

## 2025-06-30 ENCOUNTER — TELEPHONE (OUTPATIENT)
Facility: CLINIC | Age: 65
End: 2025-06-30

## 2025-06-30 NOTE — TELEPHONE ENCOUNTER
I mailed out letter to patient   Updated the health maintenance and patient outreach  Entered 5 Yr Colon Recall      Last CLN done: 06/18/2025  Next CLN due: 06/18/2030     LIAM Phillips MD  [facility] Clinical Staff  GI staff: please place recall for colonoscopy in 5 years

## 2025-07-01 ENCOUNTER — OFFICE VISIT (OUTPATIENT)
Dept: PHYSICAL THERAPY | Age: 65
End: 2025-07-01
Attending: PHYSICAL MEDICINE & REHABILITATION
Payer: MEDICARE

## 2025-07-01 DIAGNOSIS — M25.562 ACUTE PAIN OF LEFT KNEE: Primary | ICD-10-CM

## 2025-07-01 PROCEDURE — 97530 THERAPEUTIC ACTIVITIES: CPT

## 2025-07-01 PROCEDURE — 97161 PT EVAL LOW COMPLEX 20 MIN: CPT

## 2025-07-01 PROCEDURE — 97110 THERAPEUTIC EXERCISES: CPT

## 2025-07-02 NOTE — PROGRESS NOTES
LOWER EXTREMITY EVALUATION:     Diagnosis:   Left Knee Pain Patient:  Coby Clemente (65 year old, female)        Referring Provider: Abdirahman Bailey  Today's Date   2025    Precautions:  Other (use comment)   Date of Evaluation: 25  Next MD visit: 2025  Date of Surgery: None     PATIENT SUMMARY     Summary of chief complaints: Left knee pain from a fall.  History of current condition: Has had left knee pain since 2025. She tripped and fell and injured her left knee.   Pain level: current 0 /10, at best 0 /10, at worst 6 /10  Description of symptoms: She desribes the pain as sharp and achy behind the left knee.   Occupation: Retired.   Leisure activities/Hobbies: Walking   Prior level of function: Ind.  Current limitations: Bending, WB and Sitting  Pt goals: Return to plof.  Past medical history was reviewed with Coby.  Significant findings include: Thyroid Disease  Imaging/Tests: X-rays were positive for left knee OA   Coby  has a past medical history of Anemia, Disorder of thyroid, Healthy adult on routine physical examination, Hypothyroidism, Internal hemorrhoids (10/23/2015), Obesity, Osteoarthritis, Pain in soft tissues of limb, URI (upper respiratory infection), Uterine myoma, and Visual impairment.  She  has a past surgical history that includes Breast biopsy (Right, 2009); other surgical history (2012);  (1984); colonoscopy (10/23/2015); mary localization wire 1 site right (cpt=19281); colonoscopy (N/A, 2025); and colonoscopy (N/A, 2025).    ASSESSMENT  Coby presents to physical therapy evaluation with primary c/o Left knee pain from a fall.. The results of the objective tests and measures show decreased rom, tightness and weakness . Functional deficits include but are not limited to Bending, WB and Sitting. Signs and symptoms are consistent with diagnosis of Left Knee Pain. Pt and PT discussed evaluation findings, pathology, POC and  HEP.  Pt voiced understanding and performs HEP correctly without reported pain. Skilled Physical Therapy is medically necessary to address the above impairments and reach functional goals.    OBJECTIVE:      Musculoskeletal  Observation: Mihir. genu valgus and right trunk lean  Palpation: None.   Accessory Motion: Mihir. knee hypomobility in all directions; +2 grades.     Edema: Swelling at the left knee with visual observation.   Special Tests:Negative for all tests.     ROM and Strength: (* denotes performed with pain)  Hip   ROM MMT (-/5)    R L R L     Flex (L2)     4+ 3+     Ext              Abd     4+ 3+     ER             IR            ,   Knee   ROM MMT (-/5)    R L R L     Flex 120 120 4+ 3+     Ext (L3) -3 -4 4+ 3+       Flexibility:    LE Flexibility R L     Hip Flexor         Hamstrings mod restricted mod restricted     ITB         Piriformis         Quads mod restricted mod restricted     Gastroc-soleus mod restricted mod restricted     LE Flexibility Other R L              Neurological:  Sensation: Intact mihir.     Balance and Functional Mobility:  Gait: pt ambulates on level ground with decreased step length; decreased stance phase; decreased arm swing; other (use comment)   Tandem Stance: R back:  ; L back:    SLS: R 4 sec,  L 1 sec  Functional Mobility:  5x sit to stand test: 14 sec   TUG:     Stairs:Difficulty with stairs     Today's Treatment and Response:   Pt education was provided on exam findings, treatment diagnosis, treatment plan, expectations, and prognosis.    Today's Treatment       7/1/2025   LE Treatment   Therapeutic Exercise Quad Set 10x10\"  HS Set 10x10\"  Mihir. SLR 20xea.   Therapeutic Activity Discussed her injury, pt expectations and prognosis.   Therapeutic Exercise Minutes 10   Neuro Re-Educ Minutes 10   Evaluation Minutes 25   Total Time Of Timed Procedures 20   Total Time Of Service-Based Procedures 25   Total Treatment Time 45   HEP Reviewed her new HEP.        Patient was  instructed in and issued a HEP for: Reviewed her new HEP.    Charges:  PT EVAL:  , 3  In agreement with evaluation findings and clinical rationale, this evaluation involved LOW COMPLEXITY decision making due to no personal factors/comorbidities, 1-2 body structures involved/activity limitations, and stable symptoms as documented in the evaluation.                                                                                                                PLAN OF CARE:      Goals: (to be met in 10 visits)   .   Not Met Progress Toward Partially Met Met   Pt will improve knee extension ROM to 0 deg to allow proper heel strike during gait and terminal knee extension in stance. [x] [] [] []   Pt will improve knee AROM flexion to >125 degrees to improve ability to perform gait. [x] [] [] []   Pt will improve quad strength to 5/5 to ascend 1 flight of stairs reciprocally without UE assist. [x] [] [] []   Pt will increase hip and knee strength to grossly 4+/5 to be able to get up and down from the floor safely. [x] [] [] []   Pt will demonstrate increased hip ER/ABD strength to 5/5 to perform stepping and squatting activities without excessive femoral IR/ADD. [x] [] [] []   Pt will improve SLS to 30s to improve safety with gait on uneven surfaces such as grass and gravel. [x] [] [] []   Pt will be independent and compliant with comprehensive HEP to maintain progress achieved in PT. [x] [] [] []         Frequency / Duration: Patient will be seen 2x/week or a total of 10  visits over a 90 day period. Treatment will include: Gait training; Manual Therapy; Neuromuscular Re-education; Self-Care Home Management; Therapeutic Activities; Therapeutic Exercise; Home Exercise Program instruction; Electrical stimulation (unattended)    Education or treatment limitation: Compliance   Rehab Potential: fair     LEFS Score  LEFS Score: 27.5 % (7/1/2025  7:23 PM)      Patient/Family/Caregiver was advised of these findings, precautions,  and treatment options and has agreed to actively participate in planning and for this course of care.    Thank you for your referral. Please co-sign or sign and return this letter via fax as soon as possible to [phone]. If you have any questions, please contact me at Dept: [phone]    Sincerely,  Electronically signed by therapist: Chuy Singh PT, DPT, CSCS  Physician's certification required: Yes  I certify the need for these services furnished under this plan of treatment and while under my care.    X___________________________________________________ Date____________________    Certification From: 7/1/2025  To: 9/29/2025

## 2025-07-08 ENCOUNTER — OFFICE VISIT (OUTPATIENT)
Dept: PHYSICAL THERAPY | Age: 65
End: 2025-07-08
Attending: PHYSICAL MEDICINE & REHABILITATION
Payer: MEDICARE

## 2025-07-08 DIAGNOSIS — M25.562 ACUTE PAIN OF LEFT KNEE: Primary | ICD-10-CM

## 2025-07-08 PROCEDURE — 97112 NEUROMUSCULAR REEDUCATION: CPT

## 2025-07-08 PROCEDURE — 97110 THERAPEUTIC EXERCISES: CPT

## 2025-07-09 NOTE — PROGRESS NOTES
Patient: Coby Clemente (65 year old, female) Referring Provider:  Insurance:   Diagnosis: Left Knee Pain Abdirahman BUSTAMANTE [facility]   Date of Surgery: None Next MD visit:  N/A   Precautions:  Other (use comment) August 2025 Referral Information:    Date of Evaluation: Req: 8, Auth: 8, Exp: 10/1/2025    07/01/25 POC Auth Visits:  10       Today's Date   7/8/2025    Subjective          Pain: 6/10     Objective  Flexibility: Left le tightness              Assessment  Patient presents with left le tightness and weakness during her table and sitting exercises. Used ice at the end to decrease her left knee pain.    Goals (to be met in 10 visits)   .   Not Met Progress Toward Partially Met Met   Pt will improve knee extension ROM to 0 deg to allow proper heel strike during gait and terminal knee extension in stance. [x] [] [] []   Pt will improve knee AROM flexion to >125 degrees to improve ability to perform gait. [x] [] [] []   Pt will improve quad strength to 5/5 to ascend 1 flight of stairs reciprocally without UE assist. [x] [] [] []   Pt will increase hip and knee strength to grossly 4+/5 to be able to get up and down from the floor safely. [x] [] [] []   Pt will demonstrate increased hip ER/ABD strength to 5/5 to perform stepping and squatting activities without excessive femoral IR/ADD. [x] [] [] []   Pt will improve SLS to 30s to improve safety with gait on uneven surfaces such as grass and gravel. [x] [] [] []   Pt will be independent and compliant with comprehensive HEP to maintain progress achieved in PT. [x] [] [] []             Plan  Plan to work on rom, stretching and strengthening.    Treatment Last 4 Visits  Treatment Day: 2       7/1/2025 7/8/2025   LE Treatment   Therapeutic Exercise Quad Set 10x10\"  HS Set 10x10\"  Mihir. SLR 20xea. Nu-Step 8' L5  Quad Set 10x10\"   HS Set 10x10\"   Mihir. SLR 20xea.   Left HS, SKC and Calf St. 2x30\"ea.     Neuro Re-Education  S/L Abd. 20xea.  Bridges 20x2  LAQs  20xea.  Sitting Ball Squeeze 20x3\"  Gait Training   Therapeutic Activity Discussed her injury, pt expectations and prognosis.    Modalities  Ice at the left knee   Therapeutic Exercise Minutes 10 25   Neuro Re-Educ Minutes 10 20   Evaluation Minutes 25    Total Time Of Timed Procedures 20 45   Total Time Of Service-Based Procedures 25 0   Total Treatment Time 45 45   HEP Reviewed her new HEP. Reviewed her hep.        HEP  Reviewed her hep.    Charges     2TE and 1 Neuro

## 2025-07-18 ENCOUNTER — OFFICE VISIT (OUTPATIENT)
Dept: PHYSICAL THERAPY | Age: 65
End: 2025-07-18
Attending: PHYSICAL MEDICINE & REHABILITATION
Payer: MEDICARE

## 2025-07-18 DIAGNOSIS — M25.562 ACUTE PAIN OF LEFT KNEE: Primary | ICD-10-CM

## 2025-07-18 PROCEDURE — 97112 NEUROMUSCULAR REEDUCATION: CPT

## 2025-07-18 PROCEDURE — 97110 THERAPEUTIC EXERCISES: CPT

## 2025-07-18 NOTE — PROGRESS NOTES
Patient: Coby Clemente (65 year old, female) Referring Provider:  Insurance:   Diagnosis: Left Knee Pain Abdirahman BUSTAMANTE [facility]   Date of Surgery: None Next MD visit:  N/A   Precautions:  Other (use comment) August 2025 Referral Information:    Date of Evaluation: Req: 8, Auth: 8, Exp: 10/1/2025    07/01/25 POC Auth Visits:  10       Today's Date   7/18/2025    Subjective          Pain: 5/10     Objective  Flexibility: Left le tightness              Assessment  Patient presents with left le weakness and tightness during her table, sitting and wb exercises. Used ice at the end to decrease her left knee pain.    Goals (to be met in 10 visits)   .   Not Met Progress Toward Partially Met Met   Pt will improve knee extension ROM to 0 deg to allow proper heel strike during gait and terminal knee extension in stance. [x] [] [] []   Pt will improve knee AROM flexion to >125 degrees to improve ability to perform gait. [x] [] [] []   Pt will improve quad strength to 5/5 to ascend 1 flight of stairs reciprocally without UE assist. [x] [] [] []   Pt will increase hip and knee strength to grossly 4+/5 to be able to get up and down from the floor safely. [x] [] [] []   Pt will demonstrate increased hip ER/ABD strength to 5/5 to perform stepping and squatting activities without excessive femoral IR/ADD. [x] [] [] []   Pt will improve SLS to 30s to improve safety with gait on uneven surfaces such as grass and gravel. [x] [] [] []   Pt will be independent and compliant with comprehensive HEP to maintain progress achieved in PT. [x] [] [] []                 Plan  Plan to work on rom, stretching and strengthening.    Treatment Last 4 Visits  Treatment Day: 3       7/1/2025 7/8/2025 7/18/2025   LE Treatment   Therapeutic Exercise Quad Set 10x10\"  HS Set 10x10\"  Mihir. SLR 20xea. Nu-Step 8' L5  Quad Set 10x10\"   HS Set 10x10\"   Mihir. SLR 20xea.   Left HS, SKC and Calf St. 2x30\"ea.   Nu-Step 8' L5   Quad Set 10x10\"   HS Set 10x10\"    Mihir. SLR 20xea.   Left HS, SKC and Calf St. 2x30\"ea.      Neuro Re-Education  S/L Abd. 20xea.  Bridges 20x2  LAQs 20xea.  Sitting Ball Squeeze 20x3\"  Gait Training S/L Abd. 20xea.   Bridges 20x2   LAQs 20xea.   Sitting Ball Squeeze 20x3\"   Gait Training   Stairs Negotiation 5x   Therapeutic Activity Discussed her injury, pt expectations and prognosis.     Modalities  Ice at the left knee    Therapeutic Exercise Minutes 10 25 20   Neuro Re-Educ Minutes 10 20 25   Evaluation Minutes 25     Total Time Of Timed Procedures 20 45 45   Total Time Of Service-Based Procedures 25 0 0   Total Treatment Time 45 45 45   HEP Reviewed her new HEP. Reviewed her hep. Reviewed her HEP.        HEP  Reviewed her HEP.    Charges     1TE and 2Neuro

## 2025-07-21 DIAGNOSIS — E03.9 HYPOTHYROIDISM, UNSPECIFIED TYPE: ICD-10-CM

## 2025-07-21 RX ORDER — LEVOTHYROXINE SODIUM 75 UG/1
75 TABLET ORAL
Qty: 90 TABLET | Refills: 3 | Status: SHIPPED | OUTPATIENT
Start: 2025-07-21

## 2025-07-21 NOTE — TELEPHONE ENCOUNTER
Refill passed per [facility] protocol.      Last written by : Cal Alfaro MD  Last written on :01/11/2018    Please advise if refill is appropriate.      Last Office Visit: 05/28/2025    Component  Ref Range & Units (hover) 3/10/25  8:29 AM   TSH 1.520   Resulting Agency [facility] ([facility])

## 2025-07-21 NOTE — TELEPHONE ENCOUNTER
Pt called requesting refills for Levothyroxine Sodium 75 MCG Oral Tab   Pt out of medication   Please advise

## 2025-07-22 ENCOUNTER — OFFICE VISIT (OUTPATIENT)
Dept: PHYSICAL THERAPY | Age: 65
End: 2025-07-22
Attending: PHYSICAL MEDICINE & REHABILITATION
Payer: MEDICARE

## 2025-07-22 DIAGNOSIS — M25.562 ACUTE PAIN OF LEFT KNEE: Primary | ICD-10-CM

## 2025-07-22 PROCEDURE — 97112 NEUROMUSCULAR REEDUCATION: CPT

## 2025-07-22 PROCEDURE — 97110 THERAPEUTIC EXERCISES: CPT

## 2025-07-22 NOTE — PROGRESS NOTES
Patient: Coby Clemente (65 year old, female) Referring Provider:  Insurance:   Diagnosis: Left Knee Pain Abdirahman BUSTAMANTE [facility]   Date of Surgery: None Next MD visit:  N/A   Precautions:  Other (use comment) August 2025 Referral Information:    Date of Evaluation: Req: 8, Auth: 8, Exp: 10/1/2025    07/01/25 POC Auth Visits:  10       Today's Date   7/22/2025    Subjective  Patient reports she feels a little better today. She has been doing her HEP and felt ok after her last visit.       Pain: 4/10     Objective  Flexibility: Left le tightness              Assessment  Patient presents with left le tightness and weakness during her table and wb exercises. She had a decrease in pain from a 4/10 to a 3/10 after her treatment.    Goals (to be met in 10 visits)   .   Not Met Progress Toward Partially Met Met   Pt will improve knee extension ROM to 0 deg to allow proper heel strike during gait and terminal knee extension in stance. [x] [] [] []   Pt will improve knee AROM flexion to >125 degrees to improve ability to perform gait. [x] [] [] []   Pt will improve quad strength to 5/5 to ascend 1 flight of stairs reciprocally without UE assist. [x] [] [] []   Pt will increase hip and knee strength to grossly 4+/5 to be able to get up and down from the floor safely. [x] [] [] []   Pt will demonstrate increased hip ER/ABD strength to 5/5 to perform stepping and squatting activities without excessive femoral IR/ADD. [x] [] [] []   Pt will improve SLS to 30s to improve safety with gait on uneven surfaces such as grass and gravel. [x] [] [] []   Pt will be independent and compliant with comprehensive HEP to maintain progress achieved in PT. [x] [] [] []                     Plan  Plan to work on rom, stretching and strengthening.    Treatment Last 4 Visits  Treatment Day: 4       7/1/2025 7/8/2025 7/18/2025 7/22/2025   LE Treatment   Therapeutic Exercise Quad Set 10x10\"  HS Set 10x10\"  Mihir. SLR 20xea. Nu-Step 8' L5  Quad  Set 10x10\"   HS Set 10x10\"   Mihir. SLR 20xea.   Left HS, SKC and Calf St. 2x30\"ea.   Nu-Step 8' L5   Quad Set 10x10\"   HS Set 10x10\"   Mihir. SLR 20xea.   Left HS, SKC and Calf St. 2x30\"ea.    Nu-Step 8' L5   Quad Set 10x10\"   HS Set 10x10\"   Mihir. SLR 20xea.   Left HS, SKC and Calf St. 2x30\"ea.      Neuro Re-Education  S/L Abd. 20xea.  Bridges 20x2  LAQs 20xea.  Sitting Ball Squeeze 20x3\"  Gait Training S/L Abd. 20xea.   Bridges 20x2   LAQs 20xea.   Sitting Ball Squeeze 20x3\"   Gait Training   Stairs Negotiation 5x S/L Abd. 20xea.   Bridges 20x2   LAQs 20xea.   Sitting Ball Squeeze 20x3\"   Gait Training   Stairs Negotiation 5x   Sit to stand 2x10     Therapeutic Activity Discussed her injury, pt expectations and prognosis.      Modalities  Ice at the left knee     Therapeutic Exercise Minutes 10 25 20    Neuro Re-Educ Minutes 10 20 25    Evaluation Minutes 25      Total Time Of Timed Procedures 20 45 45 0   Total Time Of Service-Based Procedures 25 0 0 0   Total Treatment Time 45 45 45 0   HEP Reviewed her new HEP. Reviewed her hep. Reviewed her HEP.         HEP  Reviewed her HEP.    Charges  1TE and 2Neuro

## 2025-07-28 ENCOUNTER — OFFICE VISIT (OUTPATIENT)
Dept: PHYSICAL THERAPY | Age: 65
End: 2025-07-28
Attending: PHYSICAL MEDICINE & REHABILITATION
Payer: MEDICARE

## 2025-07-28 DIAGNOSIS — M25.562 ACUTE PAIN OF LEFT KNEE: Primary | ICD-10-CM

## 2025-07-28 PROCEDURE — 97112 NEUROMUSCULAR REEDUCATION: CPT

## 2025-07-28 PROCEDURE — 97110 THERAPEUTIC EXERCISES: CPT

## 2025-07-28 NOTE — PROGRESS NOTES
Patient: Coby Clemente (65 year old, female) Referring Provider:  Insurance:   Diagnosis: Left Knee Pain Abdirahman BUSTAMANTE [facility]   Date of Surgery: None Next MD visit:  N/A   Precautions:  Other (use comment) August 2025 Referral Information:    Date of Evaluation: Req: 8, Auth: 8, Exp: 10/1/2025    07/01/25 POC Auth Visits:  10       Today's Date   7/28/2025    Subjective  Patient reports she has increased left leg pain this afternoon. She has been doing her HEP and felt ok after her last visit.       Pain: 6/10     Objective  Flexibility: Left le tightness              Assessment  Patient presents with increased temperature at the left le during palpation. Used ice at the end of her treatment to decrease pain and temperature.    Goals (to be met in 10 visits)   .   Not Met Progress Toward Partially Met Met   Pt will improve knee extension ROM to 0 deg to allow proper heel strike during gait and terminal knee extension in stance. [x] [] [] []   Pt will improve knee AROM flexion to >125 degrees to improve ability to perform gait. [x] [] [] []   Pt will improve quad strength to 5/5 to ascend 1 flight of stairs reciprocally without UE assist. [x] [] [] []   Pt will increase hip and knee strength to grossly 4+/5 to be able to get up and down from the floor safely. [x] [] [] []   Pt will demonstrate increased hip ER/ABD strength to 5/5 to perform stepping and squatting activities without excessive femoral IR/ADD. [x] [] [] []   Pt will improve SLS to 30s to improve safety with gait on uneven surfaces such as grass and gravel. [x] [] [] []   Pt will be independent and compliant with comprehensive HEP to maintain progress achieved in PT. [x] [] [] []                         Plan  Plan to work on rom, stretching and strengthening.    Treatment Last 4 Visits  Treatment Day: 5       7/8/2025 7/18/2025 7/22/2025 7/28/2025   LE Treatment   Therapeutic Exercise Nu-Step 8' L5  Quad Set 10x10\"   HS Set 10x10\"   Mihir. SLR  20xea.   Left HS, SKC and Calf St. 2x30\"ea.   Nu-Step 8' L5   Quad Set 10x10\"   HS Set 10x10\"   Mihir. SLR 20xea.   Left HS, SKC and Calf St. 2x30\"ea.    Nu-Step 8' L5   Quad Set 10x10\"   HS Set 10x10\"   Mihir. SLR 20xea.   Left HS, SKC and Calf St. 2x30\"ea.    Nu-Step 8' L5   Quad Set 10x10\"   HS Set 10x10\"   Mihir. SLR 20xea.   Left HS, SKC and Calf St. 2x30\"ea.      Neuro Re-Education S/L Abd. 20xea.  Bridges 20x2  LAQs 20xea.  Sitting Ball Squeeze 20x3\"  Gait Training S/L Abd. 20xea.   Bridges 20x2   LAQs 20xea.   Sitting Ball Squeeze 20x3\"   Gait Training   Stairs Negotiation 5x S/L Abd. 20xea.   Bridges 20x2   LAQs 20xea.   Sitting Ball Squeeze 20x3\"   Gait Training   Stairs Negotiation 5x   Sit to stand 2x10   S/L Abd. 20xea.   Bridges 20x2   LAQs 20xea.   Sitting Ball Squeeze 20x3\"   Sit to stand 2x10      Modalities Ice at the left knee   Ice at the left knee   Therapeutic Exercise Minutes 25 20  20   Neuro Re-Educ Minutes 20 25  25   Total Time Of Timed Procedures 45 45 0 45   Total Time Of Service-Based Procedures 0 0 0 0   Total Treatment Time 45 45 0 45   HEP Reviewed her hep. Reviewed her HEP.  Did not add new exercises to her HEP.        HEP  Did not add new exercises to her HEP.    Charges     1TE and 2Neuro

## 2025-07-31 ENCOUNTER — APPOINTMENT (OUTPATIENT)
Dept: PHYSICAL THERAPY | Age: 65
End: 2025-07-31
Attending: PHYSICAL MEDICINE & REHABILITATION
Payer: MEDICARE

## 2025-08-04 ENCOUNTER — OFFICE VISIT (OUTPATIENT)
Dept: PHYSICAL MEDICINE AND REHAB | Facility: CLINIC | Age: 65
End: 2025-08-04

## 2025-08-04 VITALS — WEIGHT: 217 LBS | HEIGHT: 64 IN | BODY MASS INDEX: 37.05 KG/M2 | HEART RATE: 69 BPM | OXYGEN SATURATION: 98 %

## 2025-08-04 DIAGNOSIS — M25.562 LEFT KNEE PAIN, UNSPECIFIED CHRONICITY: Primary | ICD-10-CM

## 2025-08-04 PROCEDURE — 99213 OFFICE O/P EST LOW 20 MIN: CPT | Performed by: PHYSICAL MEDICINE & REHABILITATION

## 2025-08-04 PROCEDURE — 3008F BODY MASS INDEX DOCD: CPT | Performed by: PHYSICAL MEDICINE & REHABILITATION

## 2025-08-07 ENCOUNTER — OFFICE VISIT (OUTPATIENT)
Dept: PHYSICAL THERAPY | Age: 65
End: 2025-08-07
Attending: PHYSICAL MEDICINE & REHABILITATION

## 2025-08-07 DIAGNOSIS — M25.562 ACUTE PAIN OF LEFT KNEE: Primary | ICD-10-CM

## 2025-08-07 PROCEDURE — 97112 NEUROMUSCULAR REEDUCATION: CPT

## 2025-08-07 PROCEDURE — 97110 THERAPEUTIC EXERCISES: CPT

## 2025-08-12 ENCOUNTER — OFFICE VISIT (OUTPATIENT)
Dept: PHYSICAL THERAPY | Age: 65
End: 2025-08-12
Attending: PHYSICAL MEDICINE & REHABILITATION

## 2025-08-12 DIAGNOSIS — M25.562 ACUTE PAIN OF LEFT KNEE: Primary | ICD-10-CM

## 2025-08-12 PROCEDURE — 97112 NEUROMUSCULAR REEDUCATION: CPT

## 2025-08-12 PROCEDURE — 97110 THERAPEUTIC EXERCISES: CPT

## 2025-08-19 ENCOUNTER — OFFICE VISIT (OUTPATIENT)
Dept: PHYSICAL THERAPY | Age: 65
End: 2025-08-19
Attending: PHYSICAL MEDICINE & REHABILITATION

## 2025-08-19 DIAGNOSIS — M25.562 ACUTE PAIN OF LEFT KNEE: Primary | ICD-10-CM

## 2025-08-19 PROCEDURE — 97112 NEUROMUSCULAR REEDUCATION: CPT

## 2025-08-19 PROCEDURE — 97110 THERAPEUTIC EXERCISES: CPT

## 2025-08-22 ENCOUNTER — OFFICE VISIT (OUTPATIENT)
Dept: PHYSICAL THERAPY | Age: 65
End: 2025-08-22
Attending: PHYSICAL MEDICINE & REHABILITATION

## 2025-08-22 DIAGNOSIS — M25.562 ACUTE PAIN OF LEFT KNEE: Primary | ICD-10-CM

## 2025-08-22 PROCEDURE — 97110 THERAPEUTIC EXERCISES: CPT

## 2025-08-22 PROCEDURE — 97112 NEUROMUSCULAR REEDUCATION: CPT

## 2025-08-28 ENCOUNTER — OFFICE VISIT (OUTPATIENT)
Dept: PHYSICAL THERAPY | Age: 65
End: 2025-08-28
Attending: PHYSICAL MEDICINE & REHABILITATION

## 2025-08-28 DIAGNOSIS — M25.562 ACUTE PAIN OF LEFT KNEE: Primary | ICD-10-CM

## 2025-08-28 PROCEDURE — 97110 THERAPEUTIC EXERCISES: CPT

## 2025-08-28 PROCEDURE — 97112 NEUROMUSCULAR REEDUCATION: CPT

## 2025-08-29 ENCOUNTER — APPOINTMENT (OUTPATIENT)
Dept: PHYSICAL THERAPY | Age: 65
End: 2025-08-29
Attending: PHYSICAL MEDICINE & REHABILITATION

## (undated) DEVICE — Device

## (undated) DEVICE — KIT CLEAN ENDOKIT 1.1OZ GOWNX2

## (undated) DEVICE — KIT ENDO ORCAPOD 160/180/190

## (undated) DEVICE — MEDI-VAC NON-CONDUCTIVE SUCTION TUBING 6MM X 1.8M (6FT.) L: Brand: CARDINAL HEALTH

## (undated) DEVICE — V2 SPECIMEN COLLECTION TRAY: Brand: NEPTUNE

## (undated) DEVICE — LASSO POLYPECTOMY SNARE: Brand: LASSO

## (undated) DEVICE — V2 SPECIMEN COLLECTION MANIFOLD KIT: Brand: NEPTUNE

## (undated) DEVICE — 60 ML SYRINGE REGULAR TIP: Brand: MONOJECT

## (undated) NOTE — MR AVS SNAPSHOT
[address] at [facility]  [address]  [facility], [address] [facility] [address] [facility], [place]               Thank you for choosing us for your health care visit with James Payton MD.  We are glad to serve you and happy to kendell 3. Puede jose ibuprofeno (Motrin o Advil) o naproxeno (Aleve o Naprosyn) para aliviar el dolor y la inflamación, a menos que le hayan recetado otro medicamento.  Si no puede jose [facility], el acetaminofén (Tylenol) puede ayudarle a aliviar el do Take 1 tablet (500 mg total) by mouth 2 (two) times daily with meals.    Commonly known as:  NAPROSYN                Where to Get Your Medications      These medications were sent to [facility],  [address] [facility] [phone] 2 ½ hours per week – spread out over time Use a justin to keep you motivated   Don’t forget strength training with weights and resistance Set goals and track your progress   You don’t need to join a gym. Home exercises work great.  Put more priority on exe

## (undated) NOTE — LETTER
AUTHORIZATION FOR SURGICAL OPERATION OR OTHER PROCEDURE    1. I hereby authorize Dr. Abdirahman Bailey and the [facility] staff assigned to my case to perform the following operation and/or procedure at the [facility]:    Left knee baker cyst aspiration and injection, ultrasound guidance       2.  My physician has explained the nature and purpose of the operation or other procedure, possible alternative methods of treatment, the risks involved, and the possibility of complication to me.  I acknowledge that no guarantee has been made as to the result that may be obtained.  3.  I recognize that, during the course of this operation, or other procedure, unforseen conditions may necessitate additional or different procedure than those listed above.  I, therefore, further authorize and request that the above named physician, his/her physician assistants or designees perform such procedures as are, in his/her professional opinion, necessary and desirable.  4.  Any tissue or organs removed in the operation or other procedure may be disposed of by and at the discretion of the [facility] staff and [facility].  5.  I understand that in the event of a medical emergency, I will be transported by local paramedics to [facility] or other hospital emergency department.  6.  I certify that I have read and fully understand the above consent to operation and/or other procedure.    7.  I acknowledge that my physician has explained sedation/analgesia administration to me including the risks and benefits.  I consent to the administration of sedation/analgesia as may be necessary or desirable in the judgement of my physician.    Witness signature: ___________________________________________________ Date:  ______/______/_____                    Time:  ________ A.M.  P.MIjeoma Clemente  1/12/1960  [id number]         Patient signature:  ___________________________________________________      Statement  of Physician  My signature below affirms that prior to the time of the procedure, I have explained to the patient and/or his/her guardian, the risks and benefits involved in the proposed treatment and any reasonable alternative to the proposed treatment.  I have also explained the risks and benefits involved in the refusal of the proposed treatment and have answered the patient's questions.                        Date:  ______/______/_______  Provider                      Signature:  __________________________________________________________       Time:  ___________ A.M    P.M.

## (undated) NOTE — ED AVS SNAPSHOT
Susan Ortiz   MRN: [id number]    Department:  [facility] Emergency Department   Date of Visit:  3/7/2018           Disclosure     Insurance plans vary and the physician(s) referred by the ER may not be covered by your plan.  Please contact CARE PHYSICIAN AT ONCE OR RETURN IMMEDIATELY TO THE EMERGENCY DEPARTMENT. If you have been prescribed any medication(s), please fill your prescription right away and begin taking the medication(s) as directed.   If you believe that any of the medications

## (undated) NOTE — LETTER
Date: 2025      Patient Name: Coby Clemente      : 1960        Thank you for choosing [facility] as your health care provider. Your physician has deemed the following medical service(s) necessary. However, your insurance plan may not pay for all of your health care and costs and may deny payment for this service. The fact that your insurance plan does not pay for an item or service does not mean you should not receive it. The purpose of this form is to help you make an informed decision about whether or not you want to receive this service(s) that may not be paid for by your insurance plan.    CPT Code Description     Cost     Left knee baker cyst aspiration and injection, ultrasound guidance         I understand that the above mentioned service(s) or supply may not be covered by my insurance company. I agree to be financially responsible for the cost of this service or supply in the event of my insurance denies payment as a non-covered benefit.        ______________________________________________________________________  Signature of Patient or Patient's Representative  Relationship  Date    ______________________________________________________________________  Signature of Witness to signing of form   Printed Name

## (undated) NOTE — LETTER
[facility]   Administracion de Anestesia  Yo, Coby Clemente, acepto ser atendido por un anestesiólogo, quien está especialmente capacitado para monitorearme y darme medicamento para hacerme dormir o mantenerme cómodo rain mi procedimiento.   Entiendo que mi anestesiólogo no es un empleado o agente de [facility] o Health Services. Él o brenna trabaja para [facility]  [place] el paciente que solicita los servicios de anestesia, estoy de acuerdo con lo siguiente:  Permitir al anestesiólogo (médico de anestesia) que me suministre el medicamento y hacer los procedimientos adicionales que david necesarios. Algunos ejemplos son: Al iniciar o utilizar sudha “IV” para suministrarme medicamentos, fluidos o sobia rain mi procedimiento, y colocarme sudha sonda de respiración, me ayudará a respirar cuando esté dormido (intubación). En el tiffany de que mi corazón deje de funcionar correctamente, entiendo que mi anestesiólogo hará todo lo posible para salvar mi sunita, a menos que los documentos de No resucitar de [facility] lo indiquen de otra manera.  Informar a mi anestesista antes del procedimiento:  Si estoy embarazada.  La última vez que comí o bebí algo.  Todos los medicamentos que danielle (incluyendo medicamentos recetados, suplementos herbales y pastillas que puedo comprar sin receta médica (incluyendo drogas en la blancas [medicamentos ilegales]). No informar a mi anestesiólogo acerca de estos medicamentos puede aumentar mi riesgo de complicaciones con la anestesia.  Si soy alérgico a cualquier cosa o he tenido previamente sudha reacción adversa a la anestesia.  Entiendo cómo la anestesia me ayudará (beneficios).  Entiendo que con cualquier tipo de anestesia hay riesgos:  Los riesgos más comunes son: náuseas, vómitos, dolor de garganta, dolor muscular, daño a los ojos, la boca o los dientes (por la colocación de la sonda de respiración).  Los riesgos poco comunes incluyen:  recordar lo que sucedió rain mi procedimiento, reacciones alérgicas a los medicamentos, lesiones en las vías respiratorias, el corazón, los pulmones, la visión, los nervios o músculos, y en casos sumamente raros, la muerte.  Mii médico me ha explicado otras opciones de atención disponibles para mí (alternativas).  Pacientes embarazadas (“epidural”):    Entiendo que los riesgos de recibir sudha inyección epidural (medicamento que se aplica en la espalda para ayudar a controlar el dolor rain el parto), incluyen picazón, presión arterial baja, dificultad para orinar, dolor de moe o disminución en el ritmo del corazón del bebé. Los riesgos muy poco comunes incluyen infección, hemorragia, convulsiones, ritmo cardíaco irregular y lesión del nervio.  Anestesia regional (“columna vertebral”, “epidural” y “bloqueo de los nervios”):  Entiendo que hay complicaciones poco frecuentes gianni posibles, e incluyen dolor de moe, sangrado, infección, convulsiones, ritmo cardíaco irregular y la lesión del nervio.  .   Puedo cambiar de opinión acerca de recibir servicios de anestesia en cualquier momento antes de que se me administre el medicamento.         Paciente (o representante) Firma/Relación con el paciente  Fecha  Hora  Patient Signature/Signature of Responsible person Date Time           Nombre del intérprete (en parker tiffany)  Idioma/Organización  Hora  Name of  Language/Organization Time          Firma del anestesiólogo Fecha  Hora  Signature of anesthesiologist Date Time    He hablado del procedimiento y la información anterior con el paciente (o el representante del paciente) y respondí maris preguntas. El paciente o parker representante ha aceptado recibir los servicios de anestesia.         Testigo Fecha  Hora  Witness Date Time  He verificado que la firma es la del paciente o del representante del paciente, y que se firmó antes del procedimiento.    Nombre del paciente: Coby Clemente  Fec. de Nac.:  1/12/1960                 En letra de imprenta: June 16, 2025  Expediente médico No. [id number]                         Página 1 de 2  ----------ANESTHESIA CONSENT----------

## (undated) NOTE — LETTER
Patient Name: Coby Clemente : 1960  Medical Record #: [id number] Printed: 2025  Page 1 of 2                                          [facility]  [address], [place], [place]  Autorización para operación y procedimiento quirúrgico                                                                                                      Por la presente, autorizo a LIAM Phillips MD, mi médico y al asistente a realizar la operación/procedimiento quirúrgico a continuación, así jimmy a administrar la anestesia que determine necesaria mi médico Nombre (s) de la operación/procedimiento: COLONOSCOPY en Coby Clemente     Reconozco que rain la operación/procedimiento quirúrgico, las condiciones imprevistas pueden requerir de procedimientos adicionales o diferentes a aquellos mencionados anteriormente.  Por lo tanto, autorizo y solicito además que el cirujano antes mencionado, los asistentes o las personas designadas realicen los procedimientos que, a parker juicio, david necesarios y convenientes.    Mi cirujano/médico moran discutido antes de mi cirugía los posibles beneficios, riesgos y efectos secundarios de indira procedimiento, la probabilidad de alcanzar las metas y los posibles problemas que puedan ocurrir rain la recuperación.  Ellos también colón discutido las alternativas razonables al procedimiento, incluso los riesgos, beneficios y efectos secundarios relacionados con las alternativas y los riesgos relacionados con no realizar indira procedimiento.  Colón respondido a todas mis preguntas y confirmo que no se ha dado ninguna garantía en cuanto a los resultados que pueda obtener.    En tiffany de que surja la necesidad rain mi operación o rain el periodo postoperatorio, también autorizo se aplique sobia y/o productos sanguíneos.  Asimismo, entiendo que a pesar de las cuidadosas pruebas y análisis de sobia o de los productos sanguíneos que realizan las entidades recolectoras,  todavía puedo estar sujeto a efectos adversos jimmy resultado de recibir sudha transfusión de sobia y/o productos sanguíneos.  A continuación se mencionan algunos, aunque no todos, los riesgos potenciales que pueden ocurrir: fiebre y reacciones alérgicas, reacciones hemolíticas, trasmisión de enfermedades jimmy hepatitis, SIDA y citomegalovirus (CMV), así jimmy sobrecarga de líquidos.   En tiffany de que desee tener sudha transfusión autóloga de mi propia sobia o sudha transfusión de un donante dirigido.  Lo discutiré con mi médico.  Check only if Refusing Blood or Blood Products  I understand refusal of blood or blood products as deemed necessary by my physician may have serious consequences to my condition to include possible death. I hereby assume responsibility for my refusal and release the hospital, its personnel, and my physicians from any responsibility for the consequences of my refusal.           o  Refuse       Autorizo el uso de cualquier muestra, órgano, tejido, parte del cuerpo u objeto extraño que pueda ser extraído de mi cuerpo rain la operación/procedimiento para fines de diagnóstico, investigación o de enseñanza y parker desecho posterior por las autoridades del hospital.  También, autorizo la revelación de los resultados de las pruebas de muestras y/o los informes escritos al médico tratante jimmy personal médico del hospital u otros médicos de referencia o consulta involucrados en mi atención, a discreción del patólogo o de mi médico tratante.    Doy consentimiento para que se fotografíen o graben vídeos de las operaciones o procedimientos a realizarse, incluidas las partes de mi cuerpo que david adecuadas para propósitos médicos, científicos o educativos, en el entendido de que, mi identidad no sea revelada por las fotografías o por textos descriptivos que las acompañen.  Si el procedimiento es fotografiado o grabado en vídeo, el cirujano obtendrá la imagen, cinta de vídeo o CD original.  El hospital no se  hará responsable por el almacenamiento, la revelación o el mantenimiento de la imagen, cinta o CD.    Autorizo la presencia de un especialista de producto u observadores en el quirófano, según lo considere necesario mi médico o las personas que éste designe.     Reconozco que en tiffany de que mi procedimiento resulte en un tiempo prolongado de radiografía/fluoroscopia, puedo desarrollar sudha reacción en la piel.    Si tengo sudha orden de No intentar la reanimación (KARON), michelle estado se suspenderá mientras esté en el quirófano, la agueda de procedimientos y rain el periodo de recuperación a menos que yo indique lo contrario explícitamente (o sudha persona autorizada a rico el consentimiento en mi nombre). El cirujano o mi médico tratante determinarán cuándo termina el periodo de recuperación aplicable a los efectos de restablecer la orden de KARON.  Pacientes que se realizan un procedimiento de esterilización: Entiendo que si el procedimiento tiene éxito, los resultados serán permanentes y que, por lo tanto, me será imposible inseminar, concebir o tener hijos.  Asimismo, entiendo que el procedimiento tiene jimmy propósito la esterilidad, aunque el resultado no está garantizado.   Admito que mi médico me ha explicado la aplicación de sedación/analgesia, incluidos los riesgos y beneficios y, consiento a la administración de sedación/analgesia conforme sea necesario o conveniente a juicio de mi médico.      CERTIFICO QUE HE LEÍDO Y COMPRENDIDO EL CONSENTIMIENTO ANTERIOR PARA LA OPERACIÓN y/o PROCEDIMIENTO.             ______________________________________  _______________________________  Firma del paciente      Firma de la persona responsible  Signature of patient      Signature of responsible person                        ___________________________________                                   Nombre en imprenta de la persona responsible         Name of responsible person          ___________________________________                  Relación con el paciente         Relationship to patient    _________________________________________  ______________ ________________  Firma del testigo          Fecha / Date Hora / Time  Signature of witness    DECLARACÓN DEL MÉDICO Mediante mi firma al calce, afirmo que antes de la hora del procedimiento, he explicadoal paciente y/o a parker representante legal, los riesgos y beneficios involucrados en el tratamiento propuesto, así comocualquier alternativa razonable al tratamiento propuesto. También le(s) he explicado los riesgos y beneficios involucradosen el rechazo del tratarniento propuesto y alternativas al tratamiento propuesto, y he respondido a las preguntas del(la) paciente(My signature below affirms that prior to the time of the procedure, I have explained to the patient and/or his/her guardian, therisks and benefits involved in the proposed treatment and any reasonable alternative to the proposed treatment. I have alsoexplained the risks and benefits involved in refusal of the proposed treatment and have answered the patient's questions.)    ________________________________________   _________________________   _____________   (Firma del médico/Signature of Physician)                                    (Fecha/Date)                                             (Hora/Time)      Patient Name: Coby Clemente     : 1960                 Printed: 2025      Medical Record #: [id number]                                              Page 2 of 2